# Patient Record
Sex: FEMALE | Race: WHITE | Employment: UNEMPLOYED | URBAN - METROPOLITAN AREA
[De-identification: names, ages, dates, MRNs, and addresses within clinical notes are randomized per-mention and may not be internally consistent; named-entity substitution may affect disease eponyms.]

---

## 2019-04-07 ENCOUNTER — APPOINTMENT (OUTPATIENT)
Dept: GENERAL RADIOLOGY | Age: 29
End: 2019-04-07
Attending: EMERGENCY MEDICINE
Payer: MEDICAID

## 2019-04-07 ENCOUNTER — APPOINTMENT (OUTPATIENT)
Dept: CT IMAGING | Age: 29
End: 2019-04-07
Attending: EMERGENCY MEDICINE
Payer: MEDICAID

## 2019-04-07 ENCOUNTER — HOSPITAL ENCOUNTER (EMERGENCY)
Age: 29
Discharge: HOME | End: 2019-04-08
Attending: EMERGENCY MEDICINE
Payer: MEDICAID

## 2019-04-07 DIAGNOSIS — F19.10 POLYSUBSTANCE ABUSE (HCC): ICD-10-CM

## 2019-04-07 DIAGNOSIS — F18.10: Primary | ICD-10-CM

## 2019-04-07 DIAGNOSIS — G62.0 POLYNEUROPATHY DUE TO DRUGS (HCC): ICD-10-CM

## 2019-04-07 LAB
ALBUMIN SERPL-MCNC: 3.3 G/DL (ref 3.5–5)
ALBUMIN/GLOB SERPL: 0.7 {RATIO} (ref 1.1–2.2)
ALP SERPL-CCNC: 55 U/L (ref 45–117)
ALT SERPL-CCNC: 19 U/L (ref 12–78)
AMMONIA PLAS-SCNC: 22 UMOL/L
AMPHET UR QL SCN: NEGATIVE
ANION GAP SERPL CALC-SCNC: 7 MMOL/L (ref 5–15)
APAP SERPL-MCNC: <2 UG/ML (ref 10–30)
APPEARANCE UR: CLEAR
AST SERPL-CCNC: 34 U/L (ref 15–37)
BACTERIA URNS QL MICRO: ABNORMAL /HPF
BARBITURATES UR QL SCN: NEGATIVE
BASOPHILS # BLD: 0 K/UL (ref 0–0.1)
BASOPHILS NFR BLD: 0 % (ref 0–1)
BENZODIAZ UR QL: NEGATIVE
BILIRUB SERPL-MCNC: 0.4 MG/DL (ref 0.2–1)
BILIRUB UR QL: NEGATIVE
BUN SERPL-MCNC: 12 MG/DL (ref 6–20)
BUN/CREAT SERPL: 21 (ref 12–20)
CALCIUM SERPL-MCNC: 9 MG/DL (ref 8.5–10.1)
CANNABINOIDS UR QL SCN: POSITIVE
CHLORIDE SERPL-SCNC: 104 MMOL/L (ref 97–108)
CO2 SERPL-SCNC: 28 MMOL/L (ref 21–32)
COCAINE UR QL SCN: NEGATIVE
COLOR UR: ABNORMAL
CREAT SERPL-MCNC: 0.57 MG/DL (ref 0.55–1.02)
DIFFERENTIAL METHOD BLD: ABNORMAL
DRUG SCRN COMMENT,DRGCM: ABNORMAL
EOSINOPHIL # BLD: 0 K/UL (ref 0–0.4)
EOSINOPHIL NFR BLD: 0 % (ref 0–7)
EPITH CASTS URNS QL MICRO: ABNORMAL /LPF
ERYTHROCYTE [DISTWIDTH] IN BLOOD BY AUTOMATED COUNT: 13.6 % (ref 11.5–14.5)
ETHANOL SERPL-MCNC: 12 MG/DL
GLOBULIN SER CALC-MCNC: 4.6 G/DL (ref 2–4)
GLUCOSE SERPL-MCNC: 88 MG/DL (ref 65–100)
GLUCOSE UR STRIP.AUTO-MCNC: NEGATIVE MG/DL
HCT VFR BLD AUTO: 30.4 % (ref 35–47)
HGB BLD-MCNC: 10.9 G/DL (ref 11.5–16)
HGB UR QL STRIP: NEGATIVE
IMM GRANULOCYTES # BLD AUTO: 0 K/UL (ref 0–0.04)
IMM GRANULOCYTES NFR BLD AUTO: 0 % (ref 0–0.5)
KETONES UR QL STRIP.AUTO: NEGATIVE MG/DL
LEUKOCYTE ESTERASE UR QL STRIP.AUTO: NEGATIVE
LYMPHOCYTES # BLD: 2.7 K/UL (ref 0.8–3.5)
LYMPHOCYTES NFR BLD: 45 % (ref 12–49)
MCH RBC QN AUTO: 35.6 PG (ref 26–34)
MCHC RBC AUTO-ENTMCNC: 35.9 G/DL (ref 30–36.5)
MCV RBC AUTO: 99.3 FL (ref 80–99)
METHADONE UR QL: NEGATIVE
MONOCYTES # BLD: 0.6 K/UL (ref 0–1)
MONOCYTES NFR BLD: 11 % (ref 5–13)
MYELOCYTES NFR BLD MANUAL: 1 %
NEUTS SEG # BLD: 2.5 K/UL (ref 1.8–8)
NEUTS SEG NFR BLD: 43 % (ref 32–75)
NITRITE UR QL STRIP.AUTO: NEGATIVE
NRBC # BLD: 0 K/UL (ref 0–0.01)
NRBC BLD-RTO: 0 PER 100 WBC
OPIATES UR QL: NEGATIVE
PCP UR QL: NEGATIVE
PH UR STRIP: 6 [PH] (ref 5–8)
PLATELET # BLD AUTO: 180 K/UL (ref 150–400)
PMV BLD AUTO: 10.5 FL (ref 8.9–12.9)
POTASSIUM SERPL-SCNC: 3.3 MMOL/L (ref 3.5–5.1)
PROT SERPL-MCNC: 7.9 G/DL (ref 6.4–8.2)
PROT UR STRIP-MCNC: NEGATIVE MG/DL
RBC # BLD AUTO: 3.06 M/UL (ref 3.8–5.2)
RBC #/AREA URNS HPF: ABNORMAL /HPF (ref 0–5)
RBC MORPH BLD: ABNORMAL
SODIUM SERPL-SCNC: 139 MMOL/L (ref 136–145)
SP GR UR REFRACTOMETRY: 1.01 (ref 1–1.03)
UA: UC IF INDICATED,UAUC: ABNORMAL
UROBILINOGEN UR QL STRIP.AUTO: 1 EU/DL (ref 0.2–1)
WBC # BLD AUTO: 5.9 K/UL (ref 3.6–11)
WBC MORPH BLD: ABNORMAL
WBC URNS QL MICRO: ABNORMAL /HPF (ref 0–4)

## 2019-04-07 PROCEDURE — 70450 CT HEAD/BRAIN W/O DYE: CPT

## 2019-04-07 PROCEDURE — 73630 X-RAY EXAM OF FOOT: CPT

## 2019-04-07 PROCEDURE — 96360 HYDRATION IV INFUSION INIT: CPT

## 2019-04-07 PROCEDURE — 80053 COMPREHEN METABOLIC PANEL: CPT

## 2019-04-07 PROCEDURE — 87086 URINE CULTURE/COLONY COUNT: CPT

## 2019-04-07 PROCEDURE — 81001 URINALYSIS AUTO W/SCOPE: CPT

## 2019-04-07 PROCEDURE — 82140 ASSAY OF AMMONIA: CPT

## 2019-04-07 PROCEDURE — 81025 URINE PREGNANCY TEST: CPT

## 2019-04-07 PROCEDURE — 74011250636 HC RX REV CODE- 250/636: Performed by: EMERGENCY MEDICINE

## 2019-04-07 PROCEDURE — 99285 EMERGENCY DEPT VISIT HI MDM: CPT

## 2019-04-07 PROCEDURE — 36415 COLL VENOUS BLD VENIPUNCTURE: CPT

## 2019-04-07 PROCEDURE — 80307 DRUG TEST PRSMV CHEM ANLYZR: CPT

## 2019-04-07 PROCEDURE — 85025 COMPLETE CBC W/AUTO DIFF WBC: CPT

## 2019-04-07 PROCEDURE — 93005 ELECTROCARDIOGRAM TRACING: CPT

## 2019-04-07 RX ADMIN — SODIUM CHLORIDE 1000 ML: 900 INJECTION, SOLUTION INTRAVENOUS at 22:18

## 2019-04-08 ENCOUNTER — APPOINTMENT (OUTPATIENT)
Dept: GENERAL RADIOLOGY | Age: 29
End: 2019-04-08
Attending: EMERGENCY MEDICINE
Payer: MEDICAID

## 2019-04-08 VITALS
TEMPERATURE: 98.3 F | DIASTOLIC BLOOD PRESSURE: 69 MMHG | RESPIRATION RATE: 14 BRPM | SYSTOLIC BLOOD PRESSURE: 96 MMHG | OXYGEN SATURATION: 97 % | HEART RATE: 86 BPM

## 2019-04-08 LAB
ATRIAL RATE: 93 BPM
CALCULATED P AXIS, ECG09: 40 DEGREES
CALCULATED R AXIS, ECG10: 39 DEGREES
CALCULATED T AXIS, ECG11: 32 DEGREES
DIAGNOSIS, 93000: NORMAL
HCG UR QL: NEGATIVE
P-R INTERVAL, ECG05: 162 MS
Q-T INTERVAL, ECG07: 362 MS
QRS DURATION, ECG06: 82 MS
QTC CALCULATION (BEZET), ECG08: 450 MS
VENTRICULAR RATE, ECG03: 93 BPM

## 2019-04-08 PROCEDURE — 71046 X-RAY EXAM CHEST 2 VIEWS: CPT

## 2019-04-08 PROCEDURE — 74011250637 HC RX REV CODE- 250/637: Performed by: EMERGENCY MEDICINE

## 2019-04-08 RX ORDER — ACETAMINOPHEN 325 MG/1
650 TABLET ORAL
Status: COMPLETED | OUTPATIENT
Start: 2019-04-08 | End: 2019-04-08

## 2019-04-08 RX ORDER — ASPIRIN 325 MG/1
100 TABLET, FILM COATED ORAL DAILY
Status: DISCONTINUED | OUTPATIENT
Start: 2019-04-08 | End: 2019-04-08

## 2019-04-08 RX ORDER — UREA 10 %
50 LOTION (ML) TOPICAL DAILY
Qty: 30 TAB | Refills: 0 | Status: SHIPPED | OUTPATIENT
Start: 2019-04-08 | End: 2019-05-08

## 2019-04-08 RX ORDER — ACETAMINOPHEN 325 MG/1
325 TABLET ORAL
Status: DISCONTINUED | OUTPATIENT
Start: 2019-04-08 | End: 2019-04-08

## 2019-04-08 RX ORDER — FOLIC ACID 1 MG/1
1 TABLET ORAL DAILY
Qty: 30 TAB | Refills: 0 | Status: SHIPPED | OUTPATIENT
Start: 2019-04-08 | End: 2019-05-08

## 2019-04-08 RX ORDER — ASPIRIN 325 MG/1
100 TABLET, FILM COATED ORAL
Status: COMPLETED | OUTPATIENT
Start: 2019-04-08 | End: 2019-04-08

## 2019-04-08 RX ADMIN — ACETAMINOPHEN 650 MG: 325 TABLET ORAL at 02:06

## 2019-04-08 RX ADMIN — MULTIPLE VITAMINS W/ MINERALS TAB 1 TABLET: TAB at 00:30

## 2019-04-08 RX ADMIN — Medication 100 MG: at 00:30

## 2019-04-08 NOTE — ED NOTES
Pt resting on stretcher in a position of comfort, conversing with visitor at bedside. No apparent distress at this time.

## 2019-04-08 NOTE — ED NOTES
Bedside and Verbal shift change report given to [address] [facility] [address]  (oncoming nurse) by Sue Elizondo (offgoing nurse). Report included the following information SBAR, ED Summary, MAR and Recent Results.

## 2019-04-08 NOTE — ED NOTES
Poison control reports extremity and numbness and legs can be caused by whippits from chronic use. Acute effects are expected to have worn off by now per her estimated last usage of 8am. Suggest watching patient until patient is back to baseline

## 2019-04-08 NOTE — ED PROVIDER NOTES
EMERGENCY DEPARTMENT HISTORY AND PHYSICAL EXAM  
      
 
Date: 4/7/2019 Patient Name: Dayana Romero History of Presenting Illness Chief Complaint Patient presents with  Drug Overdose Used \"whippits\" which are apparently co2 cartridges found by grandma not responsive History Provided By:  Patient, EMS and Family HPI: Dayana Romero is a 34 y.o. female, pmhx alcoholism, polysubstance abuse, who presents via EMS to the ED with report of tingling and numbness to bilateral hands and feet. She reports that her feet \"numbness\" began 3 days ago but noted to have tingling to her hands today as well. Patient reports that she cannot move her hands while demonstrating opening and closing of her hand and flexing her wrist.  Patient also notes that she has \"numbness\" to her feet but then complains of pain. Patient with extensive history of huffing. Notes to do 150 canisters for whippets on a daily basis. Patient also notes she drinks 750 mL of vodka which she drinks daily. Patient also reports to marijuana use. Per EMS patient was confused mental status on their arrival.  EMS and police noted to be involved prior to patient's transfer to the emergency department due to her significant other also being found at the scene unresponsive. He was transported [facility] and reportedly has a large stroke. Patient's family member who presents at the end of interview notes that patient was with her children earlier today. Patient specifically denies any associated fevers, chills, nausea, vomiting, diarrhea, abd pain, CP, SOB, urinary sxs, changes in BM, or headache. PCP: Thaddeus Keane MD 
 
Social Hx: + pk/d d tobacco  + daily 750ml vodka/day EtOH , thc and whipits Illicit Drugs There are no other complaints, changes, or physical findings at this time. No Known Allergies Current Facility-Administered Medications Medication Dose Route Frequency Provider Last Rate Last Dose  acetaminophen (TYLENOL) tablet 650 mg  650 mg Oral NOW Gregorio Hernández MD      
 
Current Outpatient Medications Medication Sig Dispense Refill  thiamine (B-1) 50 mg tablet Take 1 Tab by mouth daily for 30 days. 30 Tab 0  
 folic acid (FOLVITE) 1 mg tablet Take 1 Tab by mouth daily for 30 days. 30 Tab 0  ibuprofen (MOTRIN) 800 mg tablet Take 1 Tab by mouth every eight (8) hours. 90 Tab 4  
 acetaminophen (TYLENOL) 325 mg tablet Take  by mouth every four (4) hours as needed. Past History Past Medical History: 
Past Medical History:  
Diagnosis Date  ADHD 10 Dr. Finesse Graham  ADHD (attention deficit hyperactivity disorder) 9/10/2009  Advanced care planning/counseling discussion 5/3/16  Anemia Before Preg. Not taking [facility] Gave birth to child recently [id number] Baby Boy weighed 4 lb 15 oz  HX OTHER MEDICAL   
 history of viral meningitis 1990  
 HX OTHER MEDICAL   
 stillbirth 45 wks 12/2010 [address] 2008? Achilles tendonitis  Opiate dependence ([address] started after ankle injury Past Surgical History: 
Past Surgical History:  
Procedure Laterality Date  HX GYN    
 3 vaginal births Family History: 
Family History Problem Relation Age of Onset  Hypertension Father  Hypertension Mother  Cancer Maternal Grandfather  Heart Disease Paternal Grandmother  Bipolar Disorder Mother  Bipolar Disorder Maternal Aunt Social History: 
Social History Tobacco Use  Smoking status: Current Every Day Smoker Packs/day: 1.00 Years: 5.00 Pack years: 5.00 Types: Cigarettes  Smokeless tobacco: Never Used  Tobacco comment: will quit with  Substance Use Topics  Alcohol use: No  
  Alcohol/week: 0.0 oz  
  Comment: prior drinking to intoxication.  Drug use: No  
  Types: Opiates, Other, Marijuana, Prescription Comment: [facility] clinic daily-Last day 10/13/15. [address] Allergies: 
No Known Allergies Review of Systems Review of Systems Constitutional: Negative. Negative for fever. Eyes: Negative. Respiratory: Negative. Negative for shortness of breath. Cardiovascular: Negative for chest pain. Gastrointestinal: Negative for abdominal pain, nausea and vomiting. Endocrine: Negative. Genitourinary: Negative. Negative for difficulty urinating, dysuria and hematuria. Musculoskeletal: Negative. Skin: Negative. Neurological: Positive for light-headedness and numbness. Psychiatric/Behavioral: Negative for suicidal ideas. All other systems reviewed and are negative. Physical Exam  
Physical Exam  
Constitutional: She is oriented to person, place, and time. She appears well-developed and well-nourished. No distress. HENT:  
Head: Normocephalic and atraumatic. Nose: Nose normal.  
Eyes: Conjunctivae and EOM are normal. No scleral icterus. Neck: Normal range of motion. No tracheal deviation present. Cardiovascular: Normal rate, regular rhythm, normal heart sounds and intact distal pulses. Exam reveals no friction rub. No murmur heard. Pulses: 
     Dorsalis pedis pulses are 2+ on the right side, and 2+ on the left side. Pulmonary/Chest: Effort normal and breath sounds normal. No stridor. No respiratory distress. She has no wheezes. She has no rales. Abdominal: Soft. Bowel sounds are normal. She exhibits no distension. There is no tenderness. There is no rebound. Musculoskeletal: Normal range of motion. She exhibits no tenderness. Full range of motion of all extremities. Able to open and close fingers on both hands and flex/extend the wrists. Neurological: She is alert and oriented to person, place, and time. She displays tremor. She displays no atrophy. A sensory deficit (hyperesthesia to bilateral feet) is present. No cranial nerve deficit.  She exhibits normal muscle tone. She displays no seizure activity. Coordination and gait ( pt ambulated with staff providing no physical support) normal. GCS eye subscore is 4. GCS verbal subscore is 5. GCS motor subscore is 6. Skin: Skin is warm and dry. Ecchymosis (pt with discoloration to R foot. please see associated pictures) and rash noted. Rash is macular (to R foot). She is not diaphoretic. Psychiatric: She has a normal mood and affect. Her speech is normal and behavior is normal. Judgment and thought content normal. Cognition and memory are normal.  
Nursing note and vitals reviewed. Diagnostic Study Results Labs - Recent Results (from the past 12 hour(s)) EKG, 12 LEAD, INITIAL Collection Time: 04/07/19  9:16 PM  
Result Value Ref Range Ventricular Rate 93 BPM  
 Atrial Rate 93 BPM  
 P-R Interval 162 ms QRS Duration 82 ms Q-T Interval 362 ms QTC Calculation (Bezet) 450 ms Calculated P Axis 40 degrees Calculated R Axis 39 degrees Calculated T Axis 32 degrees Diagnosis Normal sinus rhythm Normal ECG No previous ECGs available URINALYSIS W/ REFLEX CULTURE Collection Time: 04/07/19  9:29 PM  
Result Value Ref Range Color YELLOW/STRAW Appearance CLEAR CLEAR Specific gravity 1.010 1.003 - 1.030    
 pH (UA) 6.0 5.0 - 8.0 Protein NEGATIVE  NEG mg/dL Glucose NEGATIVE  NEG mg/dL Ketone NEGATIVE  NEG mg/dL Bilirubin NEGATIVE  NEG Blood NEGATIVE  NEG Urobilinogen 1.0 0.2 - 1.0 EU/dL Nitrites NEGATIVE  NEG Leukocyte Esterase NEGATIVE  NEG    
 WBC 0-4 0 - 4 /hpf  
 RBC 0-5 0 - 5 /hpf Epithelial cells FEW FEW /lpf Bacteria 1+ (A) NEG /hpf  
 UA:UC IF INDICATED URINE CULTURE ORDERED (A) CNI    
DRUG SCREEN, URINE Collection Time: 04/07/19  9:29 PM  
Result Value Ref Range AMPHETAMINES NEGATIVE  NEG    
 BARBITURATES NEGATIVE  NEG  BENZODIAZEPINES NEGATIVE  NEG    
 COCAINE NEGATIVE  NEG    
 METHADONE NEGATIVE  NEG    
 OPIATES NEGATIVE  NEG    
 PCP(PHENCYCLIDINE) NEGATIVE  NEG    
 THC (TH-CANNABINOL) POSITIVE (A) NEG Drug screen comment (NOTE) ACETAMINOPHEN Collection Time: 04/07/19  9:33 PM  
Result Value Ref Range Acetaminophen level <2 (L) 10 - 30 ug/mL AMMONIA Collection Time: 04/07/19  9:33 PM  
Result Value Ref Range Ammonia 22 <32 UMOL/L  
ETHYL ALCOHOL Collection Time: 04/07/19  9:33 PM  
Result Value Ref Range ALCOHOL(ETHYL),SERUM 12 (H) <10 MG/DL  
CBC WITH AUTOMATED DIFF Collection Time: 04/07/19  9:33 PM  
Result Value Ref Range WBC 5.9 3.6 - 11.0 K/uL  
 RBC 3.06 (L) 3.80 - 5.20 M/uL  
 HGB 10.9 (L) 11.5 - 16.0 g/dL HCT 30.4 (L) 35.0 - 47.0 % MCV 99.3 (H) 80.0 - 99.0 FL  
 MCH 35.6 (H) 26.0 - 34.0 PG  
 MCHC 35.9 30.0 - 36.5 g/dL  
 RDW 13.6 11.5 - 14.5 % PLATELET 543 705 - 494 K/uL MPV 10.5 8.9 - 12.9 FL  
 NRBC 0.0 0  WBC ABSOLUTE NRBC 0.00 0.00 - 0.01 K/uL NEUTROPHILS 43 32 - 75 % LYMPHOCYTES 45 12 - 49 % MONOCYTES 11 5 - 13 % EOSINOPHILS 0 0 - 7 % BASOPHILS 0 0 - 1 % MYELOCYTES 1 % IMMATURE GRANULOCYTES 0 0.0 - 0.5 % ABS. NEUTROPHILS 2.5 1.8 - 8.0 K/UL  
 ABS. LYMPHOCYTES 2.7 0.8 - 3.5 K/UL  
 ABS. MONOCYTES 0.6 0.0 - 1.0 K/UL  
 ABS. EOSINOPHILS 0.0 0.0 - 0.4 K/UL  
 ABS. BASOPHILS 0.0 0.0 - 0.1 K/UL  
 ABS. IMM. GRANS. 0.0 0.00 - 0.04 K/UL  
 DF MANUAL    
 RBC COMMENTS NORMOCYTIC, NORMOCHROMIC    
 WBC COMMENTS REACTIVE LYMPHS    
METABOLIC PANEL, COMPREHENSIVE Collection Time: 04/07/19  9:33 PM  
Result Value Ref Range Sodium 139 136 - 145 mmol/L Potassium 3.3 (L) 3.5 - 5.1 mmol/L Chloride 104 97 - 108 mmol/L  
 CO2 28 21 - 32 mmol/L Anion gap 7 5 - 15 mmol/L Glucose 88 65 - 100 mg/dL BUN 12 6 - 20 MG/DL Creatinine 0.57 0.55 - 1.02 MG/DL  
 BUN/Creatinine ratio 21 (H) 12 - 20 GFR est AA >60 >60 ml/min/1.73m2 GFR est non-AA >60 >60 ml/min/1.73m2 Calcium 9.0 8.5 - 10.1 MG/DL Bilirubin, total 0.4 0.2 - 1.0 MG/DL  
 ALT (SGPT) 19 12 - 78 U/L  
 AST (SGOT) 34 15 - 37 U/L Alk. phosphatase 55 45 - 117 U/L Protein, total 7.9 6.4 - 8.2 g/dL Albumin 3.3 (L) 3.5 - 5.0 g/dL Globulin 4.6 (H) 2.0 - 4.0 g/dL A-G Ratio 0.7 (L) 1.1 - 2.2 Radiologic Studies -  
XR CHEST PA LAT Final Result IMPRESSION: No acute cardiopulmonary process seen XR FOOT RT MIN 3 V Final Result IMPRESSION: No acute abnormality. CT HEAD WO CONT Final Result IMPRESSION: Normal study CT Results  (Last 48 hours) 04/07/19 2208  CT HEAD WO CONT Final result Impression:  IMPRESSION: Normal study Narrative:  EXAM: CT HEAD WO CONT INDICATION: Confusion/delirium, altered LOC, unexplained; ams COMPARISON: None. CONTRAST: None. TECHNIQUE: Unenhanced CT of the head was performed using 5 mm images. Brain and  
bone windows were generated. CT dose reduction was achieved through use of a  
standardized protocol tailored for this examination and automatic exposure  
control for dose modulation. FINDINGS:  
The ventricles and sulci are normal in size, shape and configuration and  
midline. There is no significant white matter disease. There is no intracranial  
hemorrhage, extra-axial collection, mass, mass effect or midline shift. The  
basilar cisterns are open. No acute infarct is identified. The bone windows  
demonstrate no abnormalities. The visualized portions of the paranasal sinuses  
and mastoid air cells are clear. CXR Results  (Last 48 hours) 04/08/19 0114  XR CHEST PA LAT Final result Impression:  IMPRESSION: No acute cardiopulmonary process seen Narrative:  EXAM: XR CHEST PA LAT INDICATION: Acute mental status change COMPARISON: None. FINDINGS: PA and lateral radiographs of the chest demonstrate clear lungs.  The  
 cardiac and mediastinal contours and pulmonary vascularity are normal. The bones  
and soft tissues are within normal limits. Medical Decision Making I am the first provider for this patient. I reviewed the vital signs, available nursing notes, past medical history, past surgical history, family history and social history. Vital Signs-Reviewed the patient's vital signs. Patient Vitals for the past 12 hrs: 
 Temp Pulse Resp BP SpO2  
04/08/19 0115  86 16 92/56 99 % 04/08/19 0028  94 16 98/65 98 % 04/07/19 2145    103/66 100 % 04/07/19 2130    104/70 (!) 84 % 04/07/19 2115    95/58 97 % 04/07/19 2100     98 % 04/07/19 2100    (!) 87/61 96 % 04/07/19 2048 98.3 °F (36.8 °C) (!) 103 14 99/70 96 % Pulse Oximetry Analysis - 96% on RA Cardiac Monitor:  
Rate: 103 bpm 
Rhythm: Rhys Jackson Records Reviewed: Nursing Notes, Old Medical Records, Previous electrocardiograms, Ambulance Run Sheet, Previous Radiology Studies and Previous Laboratory Studies Provider Notes (Medical Decision Making): DDX: 
Alcoholic polyneuropathy, cardiac sensitization, intracranial hemorrhage, CVA,  sequela from chronic huffing, polysubstance abuse, electrolyte abnormality Plan: 
EKG, head CT, labs, poison center consult Impression: 
Polysubstance abuse, alcoholism, huffing, polyneuropathy ED Course:  
Initial assessment performed. The patients presenting problems have been discussed, and they are in agreement with the care plan formulated and outlined with them. I have encouraged them to ask questions as they arise throughout their visit. I reviewed our electronic medical record system for any past medical records that were available that may contribute to the patients current condition, the nursing notes and and vital signs from today's visit Nursing notes will be reviewed as they become available in realtime while the pt has been in the ED.  
Santino Sin MD 
 TOBACCO COUNSELING: 
During evaluation pt reported that they are a current tobacco user. I have spent 3 minutes discussing the medical risks of prolonged smoking habits and advised the patient of the benefits of the cessation of smoking, providing specific suggestions on how to quit. Pt has been counseled and encouraged to quit as soon as possible in order to decrease further risks to their health. Pt has conveyed their understanding of the risks involved should they continue to use tobacco products. Brigid Hayes MD 
 
EKG interpretation 2116: NSR, nl Axis, rate 93; , QRS 82, QTc 450; no acute ischemia; Brigid Hayes MD 
 
I personally reviewed pt's imaging. Official read by radiology noted above. Brigid Hayes MD 
 
  
 
PROGRESS NOTE: 
2:05 AM 
Pt notes concern over her ability to ambulate, ambulation trial passed with staff supervision. Brigid Hayes MD 
 
 
2:05 AM 
Progress note: 
Pt noted to be ready for discharge. Discussed lab and imaging findings with pt and/or family, specifically noting negative ct and normal electorlytes. Pt will follow up with  Mental health/substance abuse counselors as instructed. All questions have been answered, pt voiced understanding and agreement with plan. If narcotics were prescribed, pt's  record was reviewed and pt was advised not to drive or operate heavy machinery. If abx were prescribed, pt advised that diarrhea and rash are possible side effects of the medications. Specific return precautions provided in addition to instructions for pt to return to the ED immediately should sx worsen at any time. Brigid Hayes MD 
 
 
Critical Care Time:  
 
none Diagnosis Clinical Impression:  
1. Inhalant abuse, daily use ([address] 2. Alcoholism /alcohol abuse ([address] 3. 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 Polysubstance abuse ([facility] [address] PLAN: 
1. Current Discharge Medication List  
  
START taking these medications Details  
thiamine (B-1) 50 mg tablet Take 1 Tab by mouth daily for 30 days. Qty: 30 Tab, Refills: 0  
  
folic acid (FOLVITE) 1 mg tablet Take 1 Tab by mouth daily for 30 days. Qty: 30 Tab, Refills: 0  
  
  
 
2. Follow-up Information Follow up With Specialties Details Why Contact Info Edgar Starr MD Family Practice Schedule an appointment as soon as possible for a visit in 2 days  [address] [place] [address] 
[phone] Return to ED if worse Disposition: 
2:06 AM 
The patient's results have been reviewed with family and/or caregiver. They verbally convey their understanding and agreement of the patient's signs, symptoms, diagnosis, treatment and prognosis and additionally agree to follow up as recommended in the discharge instructions or to return to the Emergency Room should the patient's condition change prior to their follow-up appointment. The family and/or caregiver verbally agrees with the care-plan and all of their questions have been answered. The discharge instructions have also been provided to the them with educational information regarding the patient's diagnosis as well a list of reasons why the patient would want to return to the ER prior to their follow-up appointment should their condition change. Nyla Marroquin MD 
 
 
 
 
Please note that this dictation was completed with [employer], the computer voice recognition software. Quite often unanticipated grammatical, syntax, homophones, and other interpretive errors are inadvertently transcribed by the computer software. Please disregard these errors. Please excuse any errors that have escaped final proofreading This note will not be viewable in [address]

## 2019-04-08 NOTE — ED NOTES
Pt ambulatory in hallway, non slip footwear provided prior to ambulation. Saline lock removed. Pt discharged ambulatory, accompanied by male . No acute distress at time of departure.

## 2019-04-08 NOTE — DISCHARGE INSTRUCTIONS
Patient Education        Alcohol and Drug Problems: Care Instructions  Your Care Instructions    You can improve your life and health by stopping your use of alcohol or drugs. Ending dependency on alcohol or drugs may help you feel and sleep better. You may get along better with your family, friends, and coworkers. There are medicines and programs that can help. Follow-up care is a key part of your treatment and safety. Be sure to make and go to all appointments, and call your doctor if you are having problems. It's also a good idea to know your test results and keep a list of the medicines you take. How can you care for yourself at home? · If you have been given medicine to help keep you sober or reduce your cravings, be sure to take it as prescribed. · Talk to your doctor about programs that can help you stop using drugs or drinking alcohol. · If your doctor prescribes disulfiram (Antabuse), do not drink any alcohol while you are taking this medicine. You may have severe, even life-threatening, side effects from even small amounts of alcohol. · Do not tempt yourself by keeping alcohol or drugs in your home. · Learn how to say no when other people drink or use drugs. Or don't spend time with people who drink or use drugs. · Use the time and money spent on drinking or drugs to do something fun with your family or friends. Preventing a relapse  · Do not drink alcohol or use drugs at all. Using any amount of alcohol or drugs greatly increases your risk for relapse. · Seek help from organizations such as Alcoholics Anonymous, Narcotics Anonymous, or treatment facilities if you feel the need to drink alcohol or use drugs again. · Remember that recovery is a lifelong process. · Stay away from situations, friends, or places that may lead you to drink or use drugs. · Have a plan to spot and deal with relapse. Learn to recognize changes in your thinking that lead you to drink or use drugs.  These are warning signs. Get help before you start to drink or use drugs again. · Get help as soon as you can if you relapse. Some people make a plan with another person that outlines what they want that person to do for them if they relapse. The plan usually includes how to handle the relapse and who to notify in case of relapse. · Don't give up. Remember that a relapse does not mean that you have failed. Use the experience to learn the triggers that lead you to drink or use drugs. Then quit again. Many people have several relapses before they are able to quit for good. When should you call for help? Call 911 anytime you think you may need emergency care. For example, call if:    · You feel you cannot stop from hurting yourself or someone else.   [facility] your doctor now or seek immediate medical care if:    · You have serious withdrawal symptoms such as confusion, hallucinations, or severe trembling.    Watch closely for changes in your health, and be sure to contact your doctor if:    · You have a relapse.     · You need more help or support to stop. Where can you learn more? Go to http://nav-torsten.info/. Enter [id number] in the search box to learn more about \"Alcohol and Drug Problems: Care Instructions. \"  Current as of: May 7, 2018  Content Version: 11.9  © 0454-0097 [employer]. Care instructions adapted under license by [employer] (which disclaims liability or warranty for this information). If you have questions about a medical condition or this instruction, always ask your healthcare professional. [place] [address] any warranty or liability for your use of this information.          Patient Education        Misuse of Multiple Drugs: Care Instructions  Your Care Instructions    You have had treatment to help your body get rid of a combination of any of these drugs:  · Prescription medicines  · Over-the-counter medicines  · Alcohol  · Illegal drugs  Taking some drugs together may cause a bad reaction. They can have unexpected or stronger effects on your body and mind. For example, benzodiazepines (such as alprazolam and lorazepam) and alcohol both depress the nervous system. Taken together, each one is stronger than when it is taken by itself. You are getting better, but it takes time for the drugs to leave your body. It may take up to 2 weeks for your mind to clear and your mood to improve. Depending on the drugs you took, the doctor might have:  · Watched your symptoms or done tests to find out what drugs were in your body. · Treated you to control your breathing, blood pressure, and heart rate. · Tried to remove the drugs from your body by pumping your stomach or giving you a substance by mouth that absorbs chemicals. · Given you a substance that neutralizes chemicals (antidote). · Given you oxygen to help you breathe. · Given you fluids. The doctor also watched you carefully to make sure you were recovering safely. Follow-up care is a key part of your treatment and safety. Be sure to make and go to all appointments, and call your doctor if you are having problems. It's also a good idea to know your test results and keep a list of the medicines you take. How can you care for yourself at home? · When you take substances like alcohol and some drugs regularly, your body gets used to them. This is called dependency. If you are dependent on them, you may have withdrawal symptoms when you stop taking them. These symptoms may include trembling, feeling restless, and sweating. To help get past these:  ? Get plenty of rest.  ? Drink lots of fluids. ? Stay active. ? Eat a healthy diet. · If you had a tube in your throat to help you breathe, you may have a sore throat or hoarseness that can last a few days. Drinking fluids may help soothe your throat. Get help to stop using drugs. Talk to your doctor about drug and alcohol treatment programs.   When should you call for help? Call 911 anytime you think you may need emergency care. For example, call if:    · You feel you cannot stop from hurting yourself or someone else.   [facility] your doctor now or seek immediate medical care if:    · You have new or worse symptoms of withdrawal, such as trembling, feeling restless, and sweating, that you can't manage at home.    Watch closely for changes in your health, and be sure to contact your doctor if:    · You do not get better as expected.     · You need help finding the right place to get help with drug or alcohol problems. Where can you learn more? Go to http://nav-torsten.info/. Enter B109 in the search box to learn more about \"Misuse of Multiple Drugs: Care Instructions. \"  Current as of: May 7, 2018  Content Version: 11.9  © 9503-9863 [employer]. Care instructions adapted under license by [employer] (which disclaims liability or warranty for this information). If you have questions about a medical condition or this instruction, always ask your healthcare professional. [place] [address] any warranty or liability for your use of this information. Patient Education        Learning About Drug Misuse  What is drug misuse? Drug misuse means using drugs in a way that harms you or causes you to harm others. Your doctor may call it substance use disorder or drug abuse. It's possible to misuse almost any type of drug, including illegal drugs, prescription drugs, and over-the-counter drugs. An overdose happens when a person takes more than the normal or recommended amount of a drug. An overdose can result in harmful symptoms or death. Could you have a problem? If there's a chance you may be misusing drugs, it's important to find out. So ask yourself a few questions. · Do you spend a lot of time thinking about your medicine or other drug--getting it and using it?  Has that taken the place of other things you used to enjoy?  · Have you had problems with your family or friends, or at work, because of your use? · Do you use drugs even when you've told yourself you won't? Do you think you might have a problem? If you do, then you've just taken an important first step. Many people have overcome this problem. And most of them started by reaching out to others, like caring friends or family, their doctor, or a support group. How is drug misuse treated? If you think you may have a problem with drugs, talk to your doctor. You and your doctor can decide whether you have a problem and what type of treatment might help you. You may need to stay in a hospital at first, so that you can be treated for withdrawal symptoms. One of the goals of treatment is helping you get used to life without the drug. Counseling can help you prepare for people or situations that might tempt you to start using again. You can practice these skills through one-on-one counseling, family therapy, or group therapy. Therapy may be part of inpatient treatment, where you stay in a treatment center. Or it may be part of outpatient treatment, where you can fit your therapy around your job or other responsibilities. Another goal of treatment is getting ongoing support for your drug-free life. Many people find support by going to meetings like Narcotics Anonymous or SMART Recovery. This type of support can help you feel less alone and more motivated to stay drug-free. You might talk to your doctor or do an online search for local treatment programs. Or you might tell a friend or loved one that you need help. Follow-up care is a key part of your treatment and safety. Be sure to make and go to all appointments, and call your doctor if you are having problems. It's also a good idea to know your test results and keep a list of the medicines you take. Where can you learn more? Go to http://nav-torsten.info/.   Enter [phone] in the search box to learn more about \"Learning About Drug Misuse. \"  Current as of: May 7, 2018  Content Version: 11.9  © 1517-0752 [employer]. Care instructions adapted under license by [employer] (which disclaims liability or warranty for this information). If you have questions about a medical condition or this instruction, always ask your healthcare professional. [address] any warranty or liability for your use of this information. Numbness and Tingling: Care Instructions  Your Care Instructions    Many things can cause numbness or tingling. Swelling may put pressure on a nerve. This could cause you to lose feeling or have a pins-and-needles sensation on part of your body. Nerves may be damaged from trauma, toxins, or diseases, such as diabetes or multiple sclerosis (MS). Sometimes, though, the cause is not clear. If there is no clear reason for your symptoms, and you are not having any other symptoms, your doctor may suggest watching and waiting for a while to see if the numbness or tingling goes away on its own. Your doctor may want you to have blood or nerve tests to find the cause of your symptoms. Follow-up care is a key part of your treatment and safety. Be sure to make and go to all appointments, and call your doctor if you are having problems. It's also a good idea to know your test results and keep a list of the medicines you take. How can you care for yourself at home? · If your doctor prescribes medicine, take it exactly as directed. Call your doctor if you think you are having a problem with your medicine. · If you have any swelling, put ice or a cold pack on the area for 10 to 20 minutes at a time. Put a thin cloth between the ice and your skin. When should you call for help? Call 911 anytime you think you may need emergency care.  For example, call if:    · You have weakness, numbness, or tingling in both legs.     · You lose bowel or bladder control.     · You have symptoms of a stroke. These may include:  ? Sudden numbness, tingling, weakness, or loss of movement in your face, arm, or leg, especially on only one side of your body. ? Sudden vision changes. ? Sudden trouble speaking. ? Sudden confusion or trouble understanding simple statements. ? Sudden problems with walking or balance. ? A sudden, severe headache that is different from past headaches.    Watch closely for changes in your health, and be sure to contact your doctor if you have any problems, or if:    · You do not get better as expected. Where can you learn more? Go to http://nav-torsten.info/. Enter T463 in the search box to learn more about \"Numbness and Tingling: Care Instructions. \"  Current as of: Marleny 3, 2018  Content Version: 11.9  © 1730-1997 [employer]. Care instructions adapted under license by [employer] (which disclaims liability or warranty for this information). If you have questions about a medical condition or this instruction, always ask your healthcare professional. [place] [address] any warranty or liability for your use of this information.             [address]:  [place] [address]  [address] [place] [address]       [phone]      Accepts Insured Patients Only:  [facility]  [address]       [phone]  Near the corner of [facility] [address] [facility] [address] in the near [facility]. Accepts most insurance including Medicaid/Medicare. No psychiatry. On the [facility] bus line. [address] most major insurances. Psychiatry available. Some DBT groups. [address] [place] [address]. Lazaro [phone]   Loli Cade, [facility] ([place])  [address] [facility], [facility] ([place])  Omega  DAMON Dean (Adolescents [place])    [address] ([address], [facility] ([address] [facility])    [address] [facility], [address] [phone]   Mook Moseley [facility] (Trauma)   Monse Castillo ([address])    [facility] [place])    [phone]   Mixture of psychologists and psychiatrists. They do not accept Medicaid or Medicare. [facility]  [address] [place], [place] [address]ve       [phone]   Mixture of clinical social workers and psychologists. [address] [facility]        [phone]  [address] [facility], [address] [facility] Counseling and Treatment  (Clinicians: Chu Rubio LCSW and CUAUHTEMOC Seymour both specialize in Trauma)  [address] , 9 [facility]        [phone]     Wendi Lay 40 [address] [place] [address], [address]         [address], [address] [facility], [address], [facility]        [address] [facility], [address] [facility], [facility]        [address] Counseling  [address]  [place], [address] [facility]        Jose Hewitt 1778 (*Two psychologists practice here)  DOYLE Wright [address] [place], [address]  [place], [address]        [phone]    Sliding Scale/Financial Aid/Differing Payment Options  [place] [id number] [facility]      [phone]   Variety of treatment options, including DBT. [employer]  [address]       [phone]   Provides a variety of group and individual counseling options. Insurance, Medicaid, Medicare and sliding scale    [facility], [address]      [phone]    [address] [facility] Psychological Services and Development ([facility])  [address] Dhruv Arellano, [facility], [address]    [phone]  Training clinic for [employer] in Psychology; [facility] also carry some cases as well. Director: Roselyn Tirado, Ph.D., LCP, NCSP (* She specializes in Anxiety; Child & Adol. Mental health)      Medicaid/Medicare providers in the [facility] OF [address]       [phone]    [facility]         [address]       [phone]    [place]oxie  [address], [address]    [phone] [address], [address]      [phone]    [address] [facility] [address]     [phone]  [place], [address] [place] [address], [address]    [address] [facility], [address] [facility] Counseling Main Office    [phone] [facility] [facility]    Intensive Community Outreach Services (ICOS)  Call ahead for appointment time  [address] [facility]     [phone]    [address]      [facility]    [facility]      [address] [facility] [address], [address], Sof[place] holguin)   [phone]      Services for patients without Medicaid, Medicare or Insurance  [facility] [address]       [phone]   See handout in separate folder    [address] [facility] [address] on-site, psychiatry, AA meetings, counseling and social workers  [place]town: Bharath Lomas 71     [address]:  [facility] 129 [phone]

## 2019-04-08 NOTE — ED TRIAGE NOTES
Patient arrives to the emergency department via EMS with a chief complaint of a drug overdose. Patient was found by fathers dad and was responsive but lethargic and felt like her feet were \"frozen\". Patient admits to using \"whippits\" which are co2 cartridges per patient and says she took about 150 of them. Patient also admits to marijuana usage today. 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 Patient is a&o x4.

## 2019-04-08 NOTE — ED NOTES
On the phone with poison control; reports whippits are nitrous oxide and patient agreed that was what they were

## 2019-04-08 NOTE — ED NOTES
Pt resting quietly on stretcher on right side. Alert and oriented at this time. Notes continued pain in hands and feet. Medicated per orders. Updated on plan of care.

## 2019-04-09 LAB
BACTERIA SPEC CULT: NORMAL
CC UR VC: NORMAL
SERVICE CMNT-IMP: NORMAL

## 2019-05-15 ENCOUNTER — OFFICE VISIT (OUTPATIENT)
Dept: FAMILY MEDICINE CLINIC | Age: 29
End: 2019-05-15

## 2019-05-15 VITALS
OXYGEN SATURATION: 98 % | TEMPERATURE: 97.3 F | BODY MASS INDEX: 24.88 KG/M2 | HEIGHT: 61 IN | WEIGHT: 131.8 LBS | HEART RATE: 91 BPM | RESPIRATION RATE: 18 BRPM | SYSTOLIC BLOOD PRESSURE: 114 MMHG | DIASTOLIC BLOOD PRESSURE: 81 MMHG

## 2019-05-15 DIAGNOSIS — Z72.0 TOBACCO ABUSE: ICD-10-CM

## 2019-05-15 DIAGNOSIS — F90.9 ATTENTION DEFICIT HYPERACTIVITY DISORDER (ADHD), UNSPECIFIED ADHD TYPE: Primary | ICD-10-CM

## 2019-05-15 RX ORDER — LIDOCAINE 50 MG/G
PATCH TOPICAL
Qty: 1 EACH | Refills: 0 | Status: CANCELLED | OUTPATIENT
Start: 2019-05-15

## 2019-05-15 RX ORDER — AMOXICILLIN AND CLAVULANATE POTASSIUM 500; 125 MG/1; MG/1
TABLET, FILM COATED ORAL
Refills: 0 | COMMUNITY
Start: 2019-05-13 | End: 2020-12-18

## 2019-05-15 RX ORDER — GABAPENTIN 100 MG/1
CAPSULE ORAL
Refills: 0 | COMMUNITY
Start: 2019-05-03 | End: 2021-03-18

## 2019-05-15 RX ORDER — KETOROLAC TROMETHAMINE 10 MG/1
TABLET, FILM COATED ORAL
Refills: 0 | COMMUNITY
Start: 2019-05-13 | End: 2021-03-18

## 2019-05-15 RX ORDER — ATOMOXETINE 40 MG/1
40 CAPSULE ORAL DAILY
Qty: 30 CAP | Refills: 0 | Status: SHIPPED | OUTPATIENT
Start: 2019-05-15 | End: 2021-03-18

## 2019-05-15 RX ORDER — LIDOCAINE 50 MG/G
PATCH TOPICAL
Refills: 0 | COMMUNITY
Start: 2019-05-03 | End: 2019-05-15

## 2019-05-15 RX ORDER — SILVER SULFADIAZINE 10 G/1000G
CREAM TOPICAL
Refills: 0 | COMMUNITY
Start: 2019-05-03 | End: 2021-03-18

## 2019-05-15 RX ORDER — PRAZOSIN HYDROCHLORIDE 1 MG/1
CAPSULE ORAL
Refills: 0 | COMMUNITY
Start: 2019-05-02 | End: 2019-05-15

## 2019-05-15 NOTE — LETTER
Name:Carolina Abdi Oral WFR:[birth year] MR #:[id number] Provider Charo Hernandez MD  
*[id number]* BSMG-491 (5/16) Page 1 of 5 Initial [employer] CONTROLLED SUBSTANCE AGREEMENT I may be prescribed medications that are controlled substances as part  of my treatment plan for management of my medical condition(s). The goal of my treatment plan is to maintain and/or improve my health and wellbeing. Because controlled substances have an increased risk of abuse or harm, continual re-evaluation is needed determine if the goals of my treatment plan are being met for my safety and the safety of others. Patrizia Lock  am entering into this Controlled Substance Agreement with my provider, Duy Abdullahi MD at [facility] . I understand that successful treatment requires mutual trust and honesty between me and my provider. I understand that there are state and federal laws and regulations which apply to the medications that my provider may prescribe that must be followed. I understand there are risks and benefits ts of taking the medicines that my provider may prescribe. I understand and agree that following this Agreement is necessary in continuing my provider-patient relationship and success of my treatment plan. As a part of my treatment plan, I agree to the following: COMMUNICATION: 
 
1. I will communicate fully with my provider about my medical condition(s), including the effect on my daily life and how well my medications are helping. I will tell my provider all of the medications that I take for any reason, including medications I receive from another health care provider, and will notify my provider about all issues, problems or concerns, including any side effects, which may be related to my medications. I understand that this information allows my provider to adjust my treatment plan to help manage my medical condition.  I understand that this information will become part of my permanent medical record. 2. I will notify my provider if I have a history of alcohol/drug misuse/addiction or if I have had treatment for alcohol/drug addiction in the past, or if I have a new problem with or concern about alcohol/drug use/addiction, because this increases the likelihood of high risk behaviors and may lead to serious medical conditions. 3. Females Only: I will notify my provider if I am or become pregnant, or if I intend to become pregnant, or if I intend to breastfeed. I understand that communication of these issues with my provider is important, due to possible effects my medication could have on an unborn fetus or breastfeeding child. Name:. Samina Pickens [facility]:[birth year]44 MR #:[id number] Provider Graeme Sanderson MD  
*[id number]* BSMG-491 (5/16) Page 2 of 5 Initial [employer] MISUSE OF MEDICATIONS / DRUGS: 
 
1. I agree to take all controlled substances as prescribed, and will not misuse or abuse any controlled substances prescribed by my provider. For my safety, I will not increase the amount of medicine I take without first talking with and getting permission from my provider. 2. If I have a medical emergency, another health care provider may prescribe me medication. If I seek emergency treatment, I will notify my provider within seventy-two (72) hours. 3. I understand that my provider may discuss my use and/or possible misuse/abuse of controlled substances and alcohol, as appropriate, with any health care provider involved in my care, pharmacist or legal authority. ILLEGAL DRUGS: 
 
1. I will not use illegal drugs of any kind, including but not limited to marijuana, heroin, cocaine, or any prescription drug which is not prescribed to me. DRUG DIVERSION / PRESCRIPTION FRAUD: 
 
1. I will not share, sell, trade, give away, or otherwise misuse my prescriptions or medications. 2. I will not alter any prescriptions provided to me by my provider. SINGLE PROVIDER: 
 
1. I agree that all controlled substances that I take will be prescribed only by my provider (or his/her covering provider) under this Agreement. This agreement does not prevent me from seeking emergency medical treatment or receiving pain management related to a surgery. PROTECTING MEDICATIONS: 
 
1. I am responsible for keeping my prescriptions and medications in a safe and secure place including safeguarding them from loss or theft. I understand that lost, stolen or damaged/destroyed prescriptions or medications will not be replaced. Name:. Severa Murrain ALDAIR:[birth year] MR #:[id number] Provider Pineda Carbone MD  
*[id number]* BSMG-491 (5/16) Page 3 of 5 Initial [employer] PRESCRIPTION RENEWALS/REFILLS: 
 
1. I will follow my controlled substance medication schedule as prescribed by my provider. 2. I understand and agree that I will make any requests for renewals or refills of my prescriptions only at the time of an office visit or during my providers regular office hours subject to the prescription refill requirements of the individual practice. 3. I understand that my provider may not call in prescriptions for controlled substances to my pharmacy. 4. I understand that my provider may adjust or discontinue these medications as deemed appropriate for my medical treatment plan. This Agreement does not guarantee the prescription of controlled medications. 5. I agree that if my medications are adjusted or discontinued, I will properly dispose of any remaining medications. I understand that I will be required to dispose of any remaining controlled medications prior to being provided with any prescriptions for other controlled medications.  
 
 
1. I authorize my provider and my pharmacy to cooperate fully with any local, state, or federal law enforcement agency in the investigation of any possible misuse, sale, or other diversion of my controlled substance prescriptions or medications. RISKS: 
 
 
1. I understand that if I do not adhere to this Agreement in any way, my provider may change my prescriptions, stop prescribing controlled substances or end our provider-patient relationship. 2. If my provider decides to stop prescribing medication, or decides to end our provider-patient relationship,my provider may require that I taper my medications slowly. If necessary, my provider may also provide a prescription for other medications to treat my withdrawal symptoms. UNDERSTANDING THIS AGREEMENT: 
 
I understand that my provider may adjust or stop my prescriptions for controlled substances based on my medical condition and my treatment plan. I understand that this Agreement does not guarantee that I will be prescribed medications or controlled substances. I understand that controlled substances may be just one part 
of my treatment plan.  
 
My initial on each page and my signature below shows that I have read each page of this Agreement, I have had an opportunity to ask questions, and all of my questions have been answered to my satisfaction by my provider. By signing below, I agree to comply with this Agreement, and I understand that if I do not follow the Agreements listed above, my provider may stop 
 
 
 
_________________________________________  Date/Time 5/15/2019 3:35 PM   
             (Patient Signature)

## 2019-05-15 NOTE — PROGRESS NOTES
Going back to school month from now. Plans to study substance abuse counseling. Advised cannot prescribe controlled meds b/o h/o substance abuse and overdose. Requests meds for sleep. Rec benadryl, melatonin OTC. On Toradol for pain per ortho. Taken off opiates. Wants to get back on med for ADD. Pt states ADD since childhood. Dx by neuro. States also dx with bipolar but on no meds for this. Visit Vitals /81 (BP 1 Location: Left arm, BP Patient Position: Sitting) Pulse 91 Temp 97.3 °F (36.3 °C) (Oral) Resp 18 Ht 5' 1\" (1.549 m) Wt 131 lb 12.8 oz (59.8 kg) LMP 04/26/2019 (Approximate) SpO2 98% BMI 24.90 kg/m² HISTORY:  The patient is alert and cooperative. Reviewed above. ASSESSMENT:  Attention deficit disorder, off of meds. PLAN:  Will try Strattera at 40 mg. Follow-up through [facility] if benefit for continued script. Continue follow-up with psychiatrist for substance abuse, posttraumatic stress, bipolar. Recheck here otherwise p.r.n. TOTAL FACE TO FACE TIME OF 10 MINUTES SPENT WITH PATIENT. GREATER THAN 50% OF TIME WAS SPENT IN COUNSELING AND/OR COORDINATION OF CARE. SEE HPI, ASSESSMENT AND PLAN FOR DETAILS.

## 2019-05-15 NOTE — PROGRESS NOTES
Satnam Blank  Identified pt with two pt identifiers(name and ). Chief Complaint Patient presents with  New Patient [facility]  Medication Refill Adderall  Post Traumatic Stress Disorder 1. Have you been to the ER, urgent care clinic since your last visit? Hospitalized since your last visit? No 
 
2. Have you seen or consulted any other health care providers outside of the [address] [place] [address] since your last visit? Include any pap smears or colon screening. Yes, Lisa Martins Surgeon, Dr. Sabiha Wright at [facility] Would you like to sign up for MyChart today, if you have not already done so? No 
If not, would you like information on MyChart, and how to sign up at a later time? No 
 
 
Medication reconciliation up to date and corrected with patient at this time. Today's provider has been notified of reason for visit, vitals and flowsheets obtained on patients. Reviewed record in preparation for visit, huddled with provider and have obtained necessary documentation. Health Maintenance Due Topic  Pneumococcal 0-64 years (1 of 1 - PPSV23)  DTaP/Tdap/Td series (1 - Tdap)  PAP AKA CERVICAL CYTOLOGY Wt Readings from Last 3 Encounters:  
05/15/19 131 lb 12.8 oz (59.8 kg) 16 121 lb 14.4 oz (55.3 kg) 16 117 lb 1 oz (53.1 kg) Temp Readings from Last 3 Encounters:  
05/15/19 97.3 °F (36.3 °C) (Oral) 19 98.3 °F (36.8 °C)  
16 98.4 °F (36.9 °C) (Oral) BP Readings from Last 3 Encounters:  
05/15/19 114/81  
19 96/69  
16 97/64 Pulse Readings from Last 3 Encounters:  
05/15/19 91  
19 86  
16 91 Vitals:  
 05/15/19 1529 BP: 114/81 Pulse: 91  
Resp: 18 Temp: 97.3 °F (36.3 °C) TempSrc: Oral  
SpO2: 98% Weight: 131 lb 12.8 oz (59.8 kg) Height: 5' 1\" (1.549 m) PainSc:  10 - Worst pain ever PainLoc: Leg  
LMP: 2019 Learning Assessment: 
:  
 
 Learning Assessment 11/5/2014 PRIMARY LEARNER Patient HIGHEST LEVEL OF EDUCATION - PRIMARY LEARNER  GRADUATED HIGH SCHOOL OR GED  
BARRIERS PRIMARY LEARNER READING  
CO-LEARNER CAREGIVER No  
PRIMARY LANGUAGE ENGLISH  
LEARNER PREFERENCE PRIMARY DEMONSTRATION  
ANSWERED BY Patient RELATIONSHIP SELF Depression Screening: 
:  
 
3 most recent PHQ Screens 5/15/2019 Little interest or pleasure in doing things Not at all Feeling down, depressed, irritable, or hopeless Not at all Total Score PHQ 2 0 Fall Risk Assessment: 
:  
 
Fall Risk Assessment, last 12 mths 5/15/2019 Able to walk? Yes Fall in past 12 months? Yes Fall with injury? Yes  
Number of falls in past 12 months 3 Fall Risk Score 4 Abuse Screening: 
:  
 
Abuse Screening Questionnaire 5/15/2019 Do you ever feel afraid of your partner? Radha Alejo Are you in a relationship with someone who physically or mentally threatens you? [place] Alejo Is it safe for you to go home? Y  
 
 
ADL Screening: 
:  
 
ADL Assessment 5/15/2019 Feeding yourself No Help Needed Getting from bed to chair No Help Needed Getting dressed No Help Needed Bathing or showering No Help Needed Walk across the room (includes cane/walker) Help Needed Using the telphone No Help Needed Taking your medications No Help Needed Preparing meals Help Needed Managing money (expenses/bills) No Help Needed Moderately strenuous housework (laundry) Help Needed Shopping for personal items (toiletries/medicines) No Help Needed Shopping for groceries Help Needed Driving No Help Needed Climbing a flight of stairs Help Needed Getting to places beyond walking distances Help Needed

## 2019-10-14 ENCOUNTER — HOSPITAL ENCOUNTER (EMERGENCY)
Age: 29
Discharge: HOME | End: 2019-10-14
Attending: EMERGENCY MEDICINE
Payer: MEDICAID

## 2019-10-14 VITALS
SYSTOLIC BLOOD PRESSURE: 120 MMHG | RESPIRATION RATE: 18 BRPM | HEART RATE: 104 BPM | TEMPERATURE: 98.3 F | DIASTOLIC BLOOD PRESSURE: 81 MMHG | OXYGEN SATURATION: 98 %

## 2019-10-14 DIAGNOSIS — Z78.9 ALCOHOL USE: ICD-10-CM

## 2019-10-14 DIAGNOSIS — F11.93 OPIOID WITHDRAWAL (HCC): Primary | ICD-10-CM

## 2019-10-14 PROCEDURE — 96374 THER/PROPH/DIAG INJ IV PUSH: CPT

## 2019-10-14 PROCEDURE — 74011250636 HC RX REV CODE- 250/636: Performed by: EMERGENCY MEDICINE

## 2019-10-14 PROCEDURE — 99284 EMERGENCY DEPT VISIT MOD MDM: CPT

## 2019-10-14 PROCEDURE — 74011250637 HC RX REV CODE- 250/637: Performed by: EMERGENCY MEDICINE

## 2019-10-14 RX ORDER — LOPERAMIDE HYDROCHLORIDE 2 MG/1
2 CAPSULE ORAL
Qty: 20 CAP | Refills: 0 | Status: SHIPPED | OUTPATIENT
Start: 2019-10-14 | End: 2019-10-24

## 2019-10-14 RX ORDER — DICYCLOMINE HYDROCHLORIDE 20 MG/1
20 TABLET ORAL EVERY 6 HOURS
Qty: 20 TAB | Refills: 0 | Status: SHIPPED | OUTPATIENT
Start: 2019-10-14 | End: 2019-10-19

## 2019-10-14 RX ORDER — TRAZODONE HYDROCHLORIDE 50 MG/1
50 TABLET ORAL
Qty: 6 TAB | Refills: 0 | Status: SHIPPED | OUTPATIENT
Start: 2019-10-14 | End: 2021-03-18

## 2019-10-14 RX ORDER — ONDANSETRON 4 MG/1
8 TABLET, ORALLY DISINTEGRATING ORAL
Status: COMPLETED | OUTPATIENT
Start: 2019-10-14 | End: 2019-10-14

## 2019-10-14 RX ORDER — FLUOXETINE 20 MG/1
20 TABLET ORAL DAILY
COMMUNITY
End: 2021-03-18

## 2019-10-14 RX ORDER — HYDROXYZINE 25 MG/1
25 TABLET, FILM COATED ORAL
Status: COMPLETED | OUTPATIENT
Start: 2019-10-14 | End: 2019-10-14

## 2019-10-14 RX ORDER — KETOROLAC TROMETHAMINE 30 MG/ML
30 INJECTION, SOLUTION INTRAMUSCULAR; INTRAVENOUS
Status: COMPLETED | OUTPATIENT
Start: 2019-10-14 | End: 2019-10-14

## 2019-10-14 RX ORDER — ONDANSETRON 8 MG/1
8 TABLET, ORALLY DISINTEGRATING ORAL
Qty: 12 TAB | Refills: 0 | OUTPATIENT
Start: 2019-10-14 | End: 2020-12-18

## 2019-10-14 RX ORDER — DICYCLOMINE HYDROCHLORIDE 10 MG/1
10 CAPSULE ORAL
Status: COMPLETED | OUTPATIENT
Start: 2019-10-14 | End: 2019-10-14

## 2019-10-14 RX ADMIN — KETOROLAC TROMETHAMINE 30 MG: 30 INJECTION, SOLUTION INTRAMUSCULAR at 07:47

## 2019-10-14 RX ADMIN — HYDROXYZINE HYDROCHLORIDE 25 MG: 25 TABLET, FILM COATED ORAL at 07:51

## 2019-10-14 RX ADMIN — DICYCLOMINE HYDROCHLORIDE 10 MG: 10 CAPSULE ORAL at 07:51

## 2019-10-14 RX ADMIN — ONDANSETRON 8 MG: 4 TABLET, ORALLY DISINTEGRATING ORAL at 07:46

## 2019-10-14 NOTE — DISCHARGE INSTRUCTIONS
Patient Education        Learning About Medication-Assisted Treatment for Opioid Use Disorder  What is opioid use disorder? Opioid use disorder means that a person uses opioids even though it causes harm to themselves or others. It can range from mild to severe. The more signs of this disorder you have, the more severe it may be. Moderate to severe opioid use disorder is sometimes called addiction. People who have it may find it hard to control their use. With this disorder, you may get strong cravings for opioids. You may need more and more of the opioid to get the same effect. This is called tolerance. Your body may also get used to opioids. This is called physical dependence. If you stop using opioids, you may have uncomfortable symptoms (withdrawal). This disorder can develop from the use of any type of opioid. Prescription ones include hydrocodone, oxycodone, fentanyl, and morphine. Heroin is an example of an illegal opioid. What is medication-assisted treatment? Medication-assisted treatment, or MAT, involves taking a medicine in place of the opioid you were using. The medicines used in MAT include:  · Buprenorphine, such as Subutex. This medicine works by targeting the same places in the brain that opioids do. · Methadone, such as Dolophine. It stops you from getting the quick \"high\" of opioids. · Naltrexone, such as ReVia. It prevents the feelings of pleasure you get from using an opioid. This medicine is only given after you have gone through withdrawal.  MAT can work in different ways, depending on which medicine you take. Sometimes it can help with withdrawal symptoms. Other times it helps you manage cravings. When you use MAT, you no longer think all the time about how to get or use opioids. You are more able to focus on getting better. You can work on how you will stay away from using opioids. What is it like to take this medicine?   Because these medicines are very powerful, doctors follow a strict set of rules for MAT. This is to make sure the medicines are used safely. Your doctor may have you sign a written agreement to take your medicine exactly as he or she tells you to. Hema Billings also agree to be careful with it and not share it with others. If you don't follow the agreement, your doctor may not be willing to keep treating you. Depending on the medicine being used, you will probably take a pill every day, at least at first. With some medicines, you may be able to take them less often after a while. You may have to go to the doctor's office or a clinic to get your medicine each time. Your dose may need to be adjusted up or down at first.  Some people have side effects, but they're usually minor. You might take the new drug for months, years, or even for life. Along with MAT, counseling and education are also an important part of treatment for opioid use disorder. Follow-up care is a key part of your treatment and safety. Be sure to make and go to all appointments, and call your doctor if you are having problems. It's also a good idea to know your test results and keep a list of the medicines you take. Where can you learn more? Go to http://nav-torsten.info/. Enter Z364 in the search box to learn more about \"Learning About Medication-Assisted Treatment for Opioid Use Disorder. \"  Current as of: February 5, 2019  Content Version: 12.2  © 9005-2539 [employer]. Care instructions adapted under license by [employer] (which disclaims liability or warranty for this information). If you have questions about a medical condition or this instruction, always ask your healthcare professional. [place] [address] any warranty or liability for your use of this information. Patient Education        Opioid Withdrawal: Care Instructions  Overview  Opioids are strong pain medicines.  Examples of prescription opioids include hydrocodone, oxycodone, fentanyl, and morphine. Heroin is an example of an illegal opioid. Your body gets used to opioids if you take them all the time. This is called physical dependence. And when you stop using opioids or use less, you go through withdrawal.  Withdrawal symptoms can include nausea, sweating, chills, diarrhea, stomach cramps, and muscle aches. Withdrawal can last up to several weeks. It depends on which opioid you took and how long you took it. You may feel very ill, but you probably aren't in medical danger. Withdrawal isn't easy, but there are things you can do to help you cope with the symptoms. You will feel a little bit better each day as your body adjusts and heals itself. Follow-up care is a key part of your treatment and safety. Be sure to make and go to all appointments, and call your doctor if you are having problems. It's also a good idea to know your test results and keep a list of the medicines you take. How can you care for yourself at home? · Your doctor may give you medicine to help you feel better. Read and follow all instructions on the label. · To help get through withdrawal, you can also:  ? Get plenty of rest.  ? Drink plenty of fluids. ? Stay active, but don't tire yourself. ? Eat a healthy diet. · Do not drink alcohol or take illegal drugs. · Do not take medications that make you tired, like sleeping pills or muscle relaxers. · Talk to your doctor about drug treatment programs to help you stay drug-free. · Talk to your doctor or pharmacist about having a naloxone rescue kit on hand. Remember after you stop taking an opioid, even for a short time, your body gets used to not having this type of drug. If you return to taking the same amount of an opioid as you did before you stopped, you could be at a higher risk for overdose. When should you call for help? Call 911 anytime you think you may need emergency care.  For example, call if:    · You feel you cannot stop from hurting yourself or someone else.    Call your doctor now or seek immediate medical care if:    · You have new or worse withdrawal symptoms that you can't manage at home, such as:  ? Stomach cramps. ? Vomiting. ? Diarrhea. ? Muscle aches. ? Sweating.    Watch closely for changes in your health, and be sure to contact your doctor if:    · You do not get better as expected. Where can you learn more? Go to http://nav-torsten.info/. Enter E242 in the search box to learn more about \"Opioid Withdrawal: Care Instructions. \"  Current as of: February 5, 2019  Content Version: 12.2  © 1712-1350 [employer]. Care instructions adapted under license by [employer] (which disclaims liability or warranty for this information). If you have questions about a medical condition or this instruction, always ask your healthcare professional. [address] any warranty or liability for your use of this information.

## 2019-10-14 NOTE — ED TRIAGE NOTES
Pt arrives to the ed via ems for c/o suboxone withdrawal. Per pt she stopped taking it last Tuesday. She states \"I was just trying to get off of it\". Per pt she also drinks alcohol daily and states her last drink was approx 1 hour ago.

## 2019-10-14 NOTE — ED PROVIDER NOTES
EMERGENCY DEPARTMENT HISTORY AND PHYSICAL EXAM      Date: 10/14/2019  Patient Name: Alexandr Lambert    Please note that this dictation was completed with iVengo, the computer voice recognition software. Quite often unanticipated grammatical, syntax, homophones, and other interpretive errors are inadvertently transcribed by the computer software. Please disregard these errors. Please excuse any errors that have escaped final proofreading. History of Presenting Illness     Chief Complaint   Patient presents with    Withdrawal       History Provided By: Patient    HPI: Alexandr Lambert, 34 y.o. female, with a past medical history significant for opiate abuse presented the emergency department complaining of opiate withdrawal.  Patient takes Suboxone, she last used 6 days ago, she was trying to get off and did not go to clinic. Admits to nausea and vomiting. Admits to diarrhea. Admits to muscle aches, fatigue, abdominal pain, nausea, vomiting, diarrhea. She states last night she was unable to sleep due to restlessness which prompted her visit here. She does admit to drinking alcohol prior to arrival.  Denies chance of pregnancy. She is tried ibuprofen, Tylenol for her symptoms. PCP: Polly Arevalo MD    No current facility-administered medications on file prior to encounter. Current Outpatient Medications on File Prior to Encounter   Medication Sig Dispense Refill    amoxicillin-clavulanate (AUGMENTIN) 500-125 mg per tablet take 1 tablet by mouth every 8 hours until finished  0    ketorolac (TORADOL) 10 mg tablet take 1 tablet by mouth every 6 hours  0    gabapentin (NEURONTIN) 100 mg capsule TAKE 2 CAPSULES BY MOUTH 3 TIMES A DAY  0    SSD 1 % topical cream APPLY TO AFFECTED AREA DAILY  0    atomoxetine (STRATTERA) 40 mg capsule Take 1 Cap by mouth daily. 30 Cap 0    acetaminophen (TYLENOL) 325 mg tablet Take  by mouth every four (4) hours as needed.          Past History     Past Medical History:  Past Medical History:   Diagnosis Date    ADHD 10    Dr. Jace Robertson    ADHD (attention deficit hyperactivity disorder) 9/10/2009    Advanced care planning/counseling discussion 5/3/16    Anemia Before Preg. Not taking Iron   Chelita Valeriano Gave birth to child recently [id number]    Baby Boy weighed 4 lb 15 oz    HX OTHER MEDICAL     history of viral meningitis 1990    HX OTHER MEDICAL     stillbirth 45 wks 12/2010    HX OTHER MEDICAL 2008? Achilles tendonitis    Opiate dependence ([address]     started after ankle injury       Past Surgical History:  Past Surgical History:   Procedure Laterality Date    HX GYN      3 vaginal births       Family History:  Family History   Problem Relation Age of Onset    Hypertension Father     Hypertension Mother     Bipolar Disorder Mother     Cancer Maternal Grandfather     Heart Disease Paternal Grandmother     Bipolar Disorder Maternal Aunt        Social History:  Social History     Tobacco Use    Smoking status: Current Every Day Smoker     Packs/day: 1.00     Years: 5.00     Pack years: 5.00     Types: Cigarettes    Smokeless tobacco: Never Used    Tobacco comment: will quit with    Substance Use Topics    Alcohol use: No     Alcohol/week: 0.0 standard drinks     Comment: prior drinking to intoxication.  Drug use: No     Types: Opiates, Other, Marijuana, Prescription     [facility]       Allergies:  No Known Allergies      Review of Systems   Review of Systems   Constitutional: Positive for chills and fatigue. Negative for fever. HENT: Negative for congestion and sore throat. Eyes: Negative for visual disturbance. Respiratory: Negative for cough and shortness of breath. Cardiovascular: Negative for chest pain and leg swelling. Gastrointestinal: Positive for abdominal pain, diarrhea, nausea and vomiting. Negative for blood in stool. Endocrine: Negative for polyuria.    Genitourinary: Negative for dysuria, flank pain, vaginal bleeding and vaginal discharge. Musculoskeletal: Positive for back pain and myalgias. Negative for arthralgias. Skin: Negative for rash. Allergic/Immunologic: Negative for immunocompromised state. Neurological: Negative for weakness and headaches. Psychiatric/Behavioral: Positive for sleep disturbance. Negative for confusion. The patient is nervous/anxious. Physical Exam   Physical Exam   Constitutional: She is oriented to person, place, and time. She appears well-developed and well-nourished. HENT:   Head: Normocephalic and atraumatic. Moist mucous membranes, poor dentition   Eyes: Pupils are equal, round, and reactive to light. Conjunctivae are normal. Right eye exhibits no discharge. Left eye exhibits no discharge. Neck: Normal range of motion. Neck supple. No tracheal deviation present. Cardiovascular: Normal rate, regular rhythm and normal heart sounds. No murmur heard. Pulmonary/Chest: Effort normal and breath sounds normal. No respiratory distress. She has no wheezes. She has no rales. Abdominal: Soft. Bowel sounds are normal. There is no tenderness. There is no rebound and no guarding. Musculoskeletal: Normal range of motion. She exhibits no edema, tenderness or deformity. Neurological: She is alert and oriented to person, place, and time. Skin: Skin is warm and dry. No rash noted. No erythema. Psychiatric: Her behavior is normal.   Nursing note and vitals reviewed. Diagnostic Study Results     Labs -   No results found for this or any previous visit (from the past 12 hour(s)). Radiologic Studies -   No orders to display     CT Results  (Last 48 hours)    None        CXR Results  (Last 48 hours)    None            Medical Decision Making   I am the first provider for this patient. I reviewed the vital signs, available nursing notes, past medical history, past surgical history, family history and social history.     Vital Signs-Reviewed the patient's vital signs. Patient Vitals for the past 12 hrs:   Temp Pulse Resp BP SpO2   10/14/19 0657 98.3 °F (36.8 °C) 69 16 130/86 97 %           Records Reviewed: Nursing Notes and Old Medical Records    Provider Notes (Medical Decision Making):   Consistent with opioid abuse and opiate withdrawal.  Will provide symptomatic therapy. Does not have a high COW score at this time, no need for ED initiation of buprenorphine. ED Course:   Initial assessment performed. The patients presenting problems have been discussed, and they are in agreement with the care plan formulated and outlined with them. I have encouraged them to ask questions as they arise throughout their visit. Critical Care Time:   none    Disposition:  DISCHARGE NOTE  Patients results have been reviewed with them. Patient and/or family have verbally conveyed their understanding and agreement of the patient's signs, symptoms, diagnosis, treatment and prognosis and additionally agree to follow up as recommended or return to the Emergency Room should their condition change or have any new concerns prior to their follow-up appointment. Patient verbally agrees with the care-plan and verbally conveys that all of their questions have been answered. Discharge instructions have also been provided to the patient with some educational information regarding their diagnosis as well a list of reasons why they would want to return to the ER prior to their follow-up appointment should their condition change. PLAN:  1. Current Discharge Medication List      START taking these medications    Details   naloxone (EVZIO) 0.4 mg/0.4 mL auto-injector 0.4 mL by IntraMUSCular route once as needed for Overdose for up to 1 dose. Qty: 1 Device, Refills: 0      dicyclomine (BENTYL) 20 mg tablet Take 1 Tab by mouth every six (6) hours for 20 doses.   Qty: 20 Tab, Refills: 0      ondansetron (ZOFRAN ODT) 8 mg disintegrating tablet Take 1 Tab by mouth every eight (8) hours as needed for Nausea. Qty: 12 Tab, Refills: 0      loperamide (IMODIUM) 2 mg capsule Take 1 Cap by mouth four (4) times daily as needed for Diarrhea for up to 10 days. Qty: 20 Cap, Refills: 0      traZODone (DESYREL) 50 mg tablet Take 1 Tab by mouth nightly. Qty: 6 Tab, Refills: 0           2. Follow-up Information     Follow up With Specialties Details Why [id number]  Schedule an appointment as soon as possible for a visit  [facility]  [phone]    [address]  Schedule an appointment as soon as possible for a visit  [address]    [facility] EMERGENCY DEPT Emergency Medicine  If symptoms worsen [address] [place] [address]  [address]  [phone]          Return to ED if worse     Diagnosis     Clinical Impression:   1. Opioid withdrawal ([address]    2. Alcohol use        Attestations:   This note was completed by Alysha Daly DO

## 2020-12-18 ENCOUNTER — HOSPITAL ENCOUNTER (EMERGENCY)
Age: 30
Discharge: HOME | End: 2020-12-18
Attending: EMERGENCY MEDICINE
Payer: MEDICAID

## 2020-12-18 VITALS
HEART RATE: 62 BPM | RESPIRATION RATE: 16 BRPM | DIASTOLIC BLOOD PRESSURE: 54 MMHG | OXYGEN SATURATION: 100 % | TEMPERATURE: 98.5 F | SYSTOLIC BLOOD PRESSURE: 100 MMHG

## 2020-12-18 DIAGNOSIS — F19.10 POLYSUBSTANCE ABUSE (HCC): ICD-10-CM

## 2020-12-18 DIAGNOSIS — R11.2 NAUSEA AND VOMITING IN ADULT: ICD-10-CM

## 2020-12-18 DIAGNOSIS — K29.00 ACUTE GASTRITIS WITHOUT HEMORRHAGE, UNSPECIFIED GASTRITIS TYPE: Primary | ICD-10-CM

## 2020-12-18 LAB
ALBUMIN SERPL-MCNC: 4.4 G/DL (ref 3.5–5)
ALBUMIN/GLOB SERPL: 1 {RATIO} (ref 1.1–2.2)
ALP SERPL-CCNC: 112 U/L (ref 45–117)
ALT SERPL-CCNC: 11 U/L (ref 12–78)
AMPHET UR QL SCN: NEGATIVE
ANION GAP SERPL CALC-SCNC: 13 MMOL/L (ref 5–15)
APPEARANCE UR: ABNORMAL
AST SERPL-CCNC: 30 U/L (ref 15–37)
BACTERIA URNS QL MICRO: ABNORMAL /HPF
BARBITURATES UR QL SCN: NEGATIVE
BASOPHILS # BLD: 0 K/UL (ref 0–0.1)
BASOPHILS NFR BLD: 0 % (ref 0–1)
BENZODIAZ UR QL: NEGATIVE
BILIRUB SERPL-MCNC: 1.1 MG/DL (ref 0.2–1)
BILIRUB UR QL CFM: NEGATIVE
BUN SERPL-MCNC: 18 MG/DL (ref 6–20)
BUN/CREAT SERPL: 20 (ref 12–20)
CALCIUM SERPL-MCNC: 9.8 MG/DL (ref 8.5–10.1)
CANNABINOIDS UR QL SCN: POSITIVE
CHLORIDE SERPL-SCNC: 96 MMOL/L (ref 97–108)
CO2 SERPL-SCNC: 27 MMOL/L (ref 21–32)
COCAINE UR QL SCN: NEGATIVE
COLOR UR: ABNORMAL
CREAT SERPL-MCNC: 0.88 MG/DL (ref 0.55–1.02)
DIFFERENTIAL METHOD BLD: ABNORMAL
DRUG SCRN COMMENT,DRGCM: ABNORMAL
EOSINOPHIL # BLD: 0 K/UL (ref 0–0.4)
EOSINOPHIL NFR BLD: 0 % (ref 0–7)
EPITH CASTS URNS QL MICRO: ABNORMAL /LPF
ERYTHROCYTE [DISTWIDTH] IN BLOOD BY AUTOMATED COUNT: 13.2 % (ref 11.5–14.5)
ETHANOL SERPL-MCNC: <10 MG/DL
GLOBULIN SER CALC-MCNC: 4.4 G/DL (ref 2–4)
GLUCOSE SERPL-MCNC: 112 MG/DL (ref 65–100)
GLUCOSE UR STRIP.AUTO-MCNC: NEGATIVE MG/DL
HCG UR QL: NEGATIVE
HCT VFR BLD AUTO: 47.8 % (ref 35–47)
HGB BLD-MCNC: 16.2 G/DL (ref 11.5–16)
HGB UR QL STRIP: ABNORMAL
IMM GRANULOCYTES # BLD AUTO: 0.1 K/UL (ref 0–0.04)
IMM GRANULOCYTES NFR BLD AUTO: 0 % (ref 0–0.5)
KETONES UR QL STRIP.AUTO: >80 MG/DL
LEUKOCYTE ESTERASE UR QL STRIP.AUTO: NEGATIVE
LIPASE SERPL-CCNC: 54 U/L (ref 73–393)
LYMPHOCYTES # BLD: 1.9 K/UL (ref 0.8–3.5)
LYMPHOCYTES NFR BLD: 14 % (ref 12–49)
MCH RBC QN AUTO: 30.6 PG (ref 26–34)
MCHC RBC AUTO-ENTMCNC: 33.9 G/DL (ref 30–36.5)
MCV RBC AUTO: 90.4 FL (ref 80–99)
METHADONE UR QL: NEGATIVE
MONOCYTES # BLD: 0.7 K/UL (ref 0–1)
MONOCYTES NFR BLD: 5 % (ref 5–13)
MUCOUS THREADS URNS QL MICRO: ABNORMAL /LPF
NEUTS SEG # BLD: 10.6 K/UL (ref 1.8–8)
NEUTS SEG NFR BLD: 81 % (ref 32–75)
NITRITE UR QL STRIP.AUTO: NEGATIVE
NRBC # BLD: 0 K/UL (ref 0–0.01)
NRBC BLD-RTO: 0 PER 100 WBC
OPIATES UR QL: POSITIVE
PCP UR QL: NEGATIVE
PH UR STRIP: 8 [PH] (ref 5–8)
PLATELET # BLD AUTO: 295 K/UL (ref 150–400)
PMV BLD AUTO: 10.6 FL (ref 8.9–12.9)
POTASSIUM SERPL-SCNC: 3.2 MMOL/L (ref 3.5–5.1)
PROT SERPL-MCNC: 8.8 G/DL (ref 6.4–8.2)
PROT UR STRIP-MCNC: 30 MG/DL
RBC # BLD AUTO: 5.29 M/UL (ref 3.8–5.2)
RBC #/AREA URNS HPF: ABNORMAL /HPF (ref 0–5)
SODIUM SERPL-SCNC: 136 MMOL/L (ref 136–145)
SP GR UR REFRACTOMETRY: 1.01 (ref 1–1.03)
UR CULT HOLD, URHOLD: NORMAL
UROBILINOGEN UR QL STRIP.AUTO: 4 EU/DL (ref 0.2–1)
WBC # BLD AUTO: 13.2 K/UL (ref 3.6–11)
WBC URNS QL MICRO: ABNORMAL /HPF (ref 0–4)

## 2020-12-18 PROCEDURE — 36415 COLL VENOUS BLD VENIPUNCTURE: CPT

## 2020-12-18 PROCEDURE — 83690 ASSAY OF LIPASE: CPT

## 2020-12-18 PROCEDURE — 85025 COMPLETE CBC W/AUTO DIFF WBC: CPT

## 2020-12-18 PROCEDURE — 81025 URINE PREGNANCY TEST: CPT

## 2020-12-18 PROCEDURE — 96361 HYDRATE IV INFUSION ADD-ON: CPT

## 2020-12-18 PROCEDURE — 81001 URINALYSIS AUTO W/SCOPE: CPT

## 2020-12-18 PROCEDURE — 99284 EMERGENCY DEPT VISIT MOD MDM: CPT

## 2020-12-18 PROCEDURE — 80053 COMPREHEN METABOLIC PANEL: CPT

## 2020-12-18 PROCEDURE — 74011250636 HC RX REV CODE- 250/636: Performed by: EMERGENCY MEDICINE

## 2020-12-18 PROCEDURE — 96374 THER/PROPH/DIAG INJ IV PUSH: CPT

## 2020-12-18 PROCEDURE — 80307 DRUG TEST PRSMV CHEM ANLYZR: CPT

## 2020-12-18 RX ORDER — FAMOTIDINE 20 MG/1
20 TABLET, FILM COATED ORAL 2 TIMES DAILY
Qty: 20 TAB | Refills: 0 | Status: SHIPPED | OUTPATIENT
Start: 2020-12-18 | End: 2020-12-28

## 2020-12-18 RX ORDER — ONDANSETRON 4 MG/1
4 TABLET, ORALLY DISINTEGRATING ORAL
Qty: 10 TAB | Refills: 0 | Status: SHIPPED | OUTPATIENT
Start: 2020-12-18 | End: 2021-03-18 | Stop reason: SDUPTHER

## 2020-12-18 RX ORDER — ONDANSETRON 2 MG/ML
4 INJECTION INTRAMUSCULAR; INTRAVENOUS
Status: COMPLETED | OUTPATIENT
Start: 2020-12-18 | End: 2020-12-18

## 2020-12-18 RX ADMIN — SODIUM CHLORIDE 1000 ML: 9 INJECTION, SOLUTION INTRAVENOUS at 12:09

## 2020-12-18 RX ADMIN — ONDANSETRON 4 MG: 2 INJECTION INTRAMUSCULAR; INTRAVENOUS at 12:09

## 2020-12-18 NOTE — ED TRIAGE NOTES
Patient arriving with c/o vomiting 5-6 (>5 times). Patient reports dysuria x 2-3 days. Patient reports heroin use today and that hasn't helped.

## 2020-12-18 NOTE — ED PROVIDER NOTES
24-year-old female with a history of IV drug abuse and opiate abuse, ADHD, PTSD, presents to the emergency department by EMS stating that she has been nauseous and vomiting for the last 5 to 6 days and had difficulty tolerating p.o. she also notes approximately 2 to 3-day history of dysuria and urinary frequency feeling similar to urinary tract infection. She notes epigastric abdominal burning pain that she rates as 7/10. She denies any fever, diarrhea, cough, shortness of breath or any other medical concerns. States that she took a dose of Pepto-Bismol about 4 hours prior to arrival which slightly improved her symptoms. She also states that she last used heroin earlier today and states that that did not help her symptoms either. She also states that she had to urinate before EMS arrived and when she heard the ambulance approaching she urinated on herself and now requests a shower. Past Medical History:   Diagnosis Date    ADHD 8    Dr. Piyush Mensah    ADHD (attention deficit hyperactivity disorder) 9/10/2009    Advanced care planning/counseling discussion 5/3/16    Anemia Before Preg. Not taking Iron   [facility] Gave birth to child recently [id number]    Baby Boy weighed 4 lb 15 oz    HX OTHER MEDICAL     history of viral meningitis 1990    HX OTHER MEDICAL     stillbirth 45 wks 12/2010    HX OTHER MEDICAL 2008?     Achilles tendonitis    Opiate dependence ([facility] [address] [facility]     started after ankle injury       Past Surgical History:   Procedure Laterality Date    HX GYN      3 vaginal births         Family History:   Problem Relation Age of Onset    Hypertension Father     Hypertension Mother     Bipolar Disorder Mother     Cancer Maternal Grandfather     Heart Disease Paternal Grandmother     Bipolar Disorder Maternal Aunt        Social History     Socioeconomic History    Marital status: LEGALLY      Spouse name: Not on file    Number of children: Not on file    Years of education: Not on file    Highest education level: Not on file   Occupational History    Occupation: , [employer]. Social Needs    Financial resource strain: Not on file    Food insecurity     Worry: Not on file     Inability: Not on file    Transportation needs     Medical: Not on file     Non-medical: Not on file   Tobacco Use    Smoking status: Current Every Day Smoker     Packs/day: 1.00     Years: 5.00     Pack years: 5.00     Types: Cigarettes    Smokeless tobacco: Never Used    Tobacco comment: will quit with    Substance and Sexual Activity    Alcohol use: No     Alcohol/week: 0.0 standard drinks     Comment: prior drinking to intoxication.  Drug use: No     Types: Opiates, Other, Marijuana, Prescription     [facility]: [facility]-Last day 10/13/15. [address]    Sexual activity: Yes     Partners: Male     Birth control/protection: None   Lifestyle    Physical activity     Days per week: Not on file     Minutes per session: Not on file    Stress: Not on file   Relationships    Social connections     Talks on phone: Not on file     Gets together: Not on file     Attends Jehovah's witness service: Not on file     Active member of club or organization: Not on file     Attends meetings of clubs or organizations: Not on file     Relationship status: Not on file    Intimate partner violence     Fear of current or ex partner: Not on file     Emotionally abused: Not on file     Physically abused: Not on file     Forced sexual activity: Not on file   Other Topics Concern    Not on file   Social History Narrative    Not on file         ALLERGIES: Patient has no known allergies. Review of Systems   Constitutional: Positive for activity change and appetite change. Negative for chills and fever. HENT: Negative for congestion, rhinorrhea, sinus pressure, sneezing and sore throat. Eyes: Negative for photophobia and visual disturbance. Respiratory: Negative for cough and shortness of breath. Cardiovascular: Negative for chest pain. Gastrointestinal: Positive for abdominal pain, nausea and vomiting. Negative for blood in stool, constipation and diarrhea. Genitourinary: Positive for dysuria, frequency and urgency. Negative for difficulty urinating, flank pain, hematuria, menstrual problem, vaginal bleeding and vaginal discharge. Musculoskeletal: Negative for arthralgias, back pain, myalgias and neck pain. Skin: Negative for rash and wound. Neurological: Negative for syncope, weakness, numbness and headaches. Psychiatric/Behavioral: Negative for self-injury and suicidal ideas. All other systems reviewed and are negative. Vitals:    12/18/20 1113   BP: (!) 128/95   Pulse: 65   Resp: 18   Temp: 98.2 °F (36.8 °C)   SpO2: 95%            Physical Exam  Vitals signs and nursing note reviewed. Constitutional:       General: She is not in acute distress. Appearance: Normal appearance. She is well-developed. She is not diaphoretic. Comments: Disheveled appearing   HENT:      Head: Normocephalic and atraumatic. Nose: Nose normal.   Eyes:      Extraocular Movements: Extraocular movements intact. Conjunctiva/sclera: Conjunctivae normal.      Pupils: Pupils are equal, round, and reactive to light. Neck:      Musculoskeletal: Neck supple. Cardiovascular:      Rate and Rhythm: Normal rate and regular rhythm. Heart sounds: Normal heart sounds. Pulmonary:      Effort: Pulmonary effort is normal.      Breath sounds: Normal breath sounds. Abdominal:      General: There is no distension. Palpations: Abdomen is soft. Tenderness: There is abdominal tenderness in the epigastric area. There is no right CVA tenderness, left CVA tenderness, guarding or rebound. Musculoskeletal:         General: No tenderness. Skin:     General: Skin is warm and dry. Neurological:      General: No focal deficit present.       Mental Status: She is alert and oriented to person, place, and time. Cranial Nerves: No cranial nerve deficit. Sensory: No sensory deficit. Motor: No weakness. Coordination: Coordination normal.          MDM   66-year-old female with history of polysubstance abuse presents with nausea, vomiting, epigastric pain and dysuria, also reportedly urinated on herself prior to arrival and requests shower which she did in the ER. Labs returned showing mild WBC of 13.2, otherwise reassuring. UA shows no evidence of UTI, does show significant epithelial contamination. HCG negative  UDS positive for opiates and THC. She was treated symptomatically in the ED and had improvement in her symptoms and was able to tolerate some p.o. soda. He was recommended substance abuse cessation. Will Rx Zofran and Pepcid for further symptomatic relief and recommended emphasis on p.o. hydration with gradual reintroduction of normal diet. Recommended PCP follow-up and return precautions were given for worsening or concerns.     Procedures

## 2020-12-18 NOTE — ED NOTES
11:45 AM Patient provided with warm shower. Reports that she urinated on herself prior to ambulance picking her up. 12:00 AM Patient urine collected; PIV started, medicated for nausea. Would like to wait for GI Cocktail until the medication starts working. Reports that usually zofran doesn't work and would like to know if she could get phenergan in that case. Also reporting increased anxiety and would like Ativan. Pamella Pool MD notified.

## 2021-03-18 ENCOUNTER — VIRTUAL VISIT (OUTPATIENT)
Dept: INTERNAL MEDICINE CLINIC | Age: 31
End: 2021-03-18
Payer: MEDICAID

## 2021-03-18 DIAGNOSIS — Z86.718 HISTORY OF DVT (DEEP VEIN THROMBOSIS): ICD-10-CM

## 2021-03-18 DIAGNOSIS — Z72.0 TOBACCO ABUSE: ICD-10-CM

## 2021-03-18 DIAGNOSIS — F41.9 ANXIETY: ICD-10-CM

## 2021-03-18 DIAGNOSIS — F43.10 PTSD (POST-TRAUMATIC STRESS DISORDER): Primary | ICD-10-CM

## 2021-03-18 DIAGNOSIS — F11.20 METHADONE MAINTENANCE THERAPY PATIENT (HCC): ICD-10-CM

## 2021-03-18 PROCEDURE — 99203 OFFICE O/P NEW LOW 30 MIN: CPT | Performed by: PHYSICIAN ASSISTANT

## 2021-03-18 RX ORDER — NALOXONE HYDROCHLORIDE 0.4 MG/ML
0.4 INJECTION, SOLUTION INTRAMUSCULAR; INTRAVENOUS; SUBCUTANEOUS
Qty: 1 ML | Refills: 3 | Status: SHIPPED | OUTPATIENT
Start: 2021-03-18

## 2021-03-18 RX ORDER — METHADONE HYDROCHLORIDE 10 MG/1
TABLET ORAL
COMMUNITY

## 2021-03-18 RX ORDER — BUPROPION HYDROCHLORIDE 150 MG/1
150 TABLET ORAL
Qty: 30 TAB | Refills: 5 | Status: SHIPPED | OUTPATIENT
Start: 2021-03-18

## 2021-03-18 RX ORDER — NALOXONE HYDROCHLORIDE 4 MG/.1ML
SPRAY NASAL
COMMUNITY
Start: 2021-02-26 | End: 2021-03-18 | Stop reason: SDUPTHER

## 2021-03-18 RX ORDER — ONDANSETRON 4 MG/1
4 TABLET, ORALLY DISINTEGRATING ORAL
Qty: 20 TAB | Refills: 2 | Status: SHIPPED | OUTPATIENT
Start: 2021-03-18

## 2021-03-18 NOTE — PROGRESS NOTES
Avelina Rides  Identified pt with two pt identifiers(name and ). Chief Complaint   Patient presents with   [address]     Previous PCP - Dr. Conrado Jose    Nausea     wants to see if something can be prescribed // been going on for a while    Post Traumatic Stress Disorder    Behavioral Problem       Reviewed record In preparation for visit and have obtained necessary documentation. 1. Have you been to the ER, urgent care clinic or hospitalized since your last visit? No     2. Have you seen or consulted any other health care providers outside of the [address] [place] [address] [facility] since your last visit? Include any pap smears or colon screening. Yes. PAP - [facility]    Patient does not have an advance directive. Vitals reviewed with provider. Health Maintenance reviewed:     Health Maintenance Due   Topic    Hepatitis C Screening     DTaP/Tdap/Td series (1 - Tdap)    PAP AKA CERVICAL CYTOLOGY           Wt Readings from Last 3 Encounters:   05/15/19 131 lb 12.8 oz (59.8 kg)   16 121 lb 14.4 oz (55.3 kg)   16 117 lb 1 oz (53.1 kg)        Temp Readings from Last 3 Encounters:   20 98.5 °F (36.9 °C)   10/14/19 98.3 °F (36.8 °C)   05/15/19 97.3 °F (36.3 °C) (Oral)        BP Readings from Last 3 Encounters:   20 (!) 100/54   10/14/19 120/81   05/15/19 114/81        Pulse Readings from Last 3 Encounters:   20 62   10/14/19 (!) 104   05/15/19 91      There were no vitals filed for this visit.        Learning Assessment:   :       Learning Assessment 3/18/2021 2014   PRIMARY LEARNER Patient Patient   HIGHEST LEVEL OF EDUCATION - PRIMARY LEARNER  GRADUATED HIGH SCHOOL OR GED GRADUATED HIGH SCHOOL OR GED   BARRIERS PRIMARY LEARNER COGNITIVE READING   CO-LEARNER CAREGIVER - No   PRIMARY LANGUAGE ENGLISH ENGLISH   LEARNER PREFERENCE PRIMARY OTHER (COMMENT) DEMONSTRATION   ANSWERED BY Patient Patient   RELATIONSHIP SELF SELF        Depression Screening: :       3 most recent PHQ Screens 3/18/2021   Little interest or pleasure in doing things Not at all   Feeling down, depressed, irritable, or hopeless Not at all   Total Score PHQ 2 0        Fall Risk Assessment:   :       Fall Risk Assessment, last 12 mths 5/15/2019   Able to walk? Yes   Fall in past 12 months? Yes   Number of falls in past 12 months 3   Fall with injury? 1        Abuse Screening:   :       Abuse Screening Questionnaire 5/15/2019   Do you ever feel afraid of your partner? N   Are you in a relationship with someone who physically or mentally threatens you? N   Is it safe for you to go home?  Y        ADL Screening:   :       ADL Assessment 3/18/2021   Feeding yourself No Help Needed   Getting from bed to chair No Help Needed   Getting dressed No Help Needed   Bathing or showering No Help Needed   Walk across the room (includes cane/walker) Help Needed   Using the telphone No Help Needed   Taking your medications No Help Needed   Preparing meals No Help Needed   Managing money (expenses/bills) No Help Needed   Moderately strenuous housework (laundry) No Help Needed   Shopping for personal items (toiletries/medicines) No Help Needed   Shopping for groceries No Help Needed   Driving No Help Needed   Climbing a flight of stairs Help Needed   Getting to places beyond walking distances Help Needed

## 2021-03-18 NOTE — PROGRESS NOTES
Flo Atkins is a 32 y.o. female who was seen by synchronous (real-time) audio-video technology on 3/18/2021 for Establish Care (Previous PCP - Dr. Henrietta Frye), Nausea (wants to see if something can be prescribed // been going on for a while), Post Traumatic Stress Disorder, and Behavioral Problem        Assessment & Plan:   Diagnoses and all orders for this visit:    1. PTSD (post-traumatic stress disorder)  -     ondansetron (Zofran ODT) 4 mg disintegrating tablet; Take 1 Tab by mouth every eight (8) hours as needed for Nausea. -     buPROPion XL (WELLBUTRIN XL) 150 mg tablet; Take 1 Tab by mouth every morning. Begin PRN Zofran for nausea and wellbutrin daily for tobacco cessation and anxiety  2. Anxiety  -     buPROPion XL (WELLBUTRIN XL) 150 mg tablet; Take 1 Tab by mouth every morning. 3. Tobacco abuse  -     buPROPion XL (WELLBUTRIN XL) 150 mg tablet; Take 1 Tab by mouth every morning. See above  4. [facility]  -     naloxone (NARCAN) 0.4 mg/mL injection; 1 mL by IntraMUSCular route EVERY 2 MINUTES AS NEEDED (overdose). Continue methadone. Needs psych and vascular surgery. Instructed to call medicaid office and find who she can see her. The complexity of medical decision making for this visit is moderate     I spent at least 30 minutes on this visit with this established patient. 712  Subjective:     Here to establish care. Former Dr. So Valera. 2019 had 7 blood clots removed from groin, leg. Now cant walk for long periods of time. Feels like a permanent binu horse. Was Medicaid and her vascular surgeon no longer accepted her insurance. Has a lot of nausea related to her menstrual cycle and panic attacks. She has a Hx of PTSD. Diagnosed in 2019. Had still born accident in 2010. After the stillborn she began using opiate pills and has been using off and on. Tried suboxone and \"it wasn't for her. \" She is currently using a Methadone treatment plan.  She notes she still feels like she is on the wrong dose as she states she constantly feels withdrawal symptoms, including nausea. She reports panic episodes \"daily\" with some agoraphobia where she gets nervous about driving and being in public. She is a tobacco user and is ready to quit as soon as she gets on the proper dose of methadone. Prior to Admission medications    Medication Sig Start Date End Date Taking? Authorizing Provider   Narcan 4 mg/actuation nasal spray  2/26/21  Yes Provider, Historical   methadone (DOLOPHINE) 10 mg tablet Take  by mouth. Yes Provider, Historical   INTRAUTERINE DEVICE, IUD, IU by IntraUTERine route. Yes Provider, Historical   ondansetron (Zofran ODT) 4 mg disintegrating tablet Take 1 Tab by mouth every eight (8) hours as needed for Nausea. 12/18/20   Ab Paulson, DO   traZODone (DESYREL) 50 mg tablet Take 1 Tab by mouth nightly. 10/14/19   Aden Hays, DO   FLUoxetine (PROZAC) 20 mg tablet Take 20 mg by mouth daily. Other, MD Brianda   buprenorphine HCl/naloxone HCl (SUBOXONE SL) by SubLINGual route daily. 16 mg daily   Indications: symptoms from stopping treatment with opioid drugs    Other, MD Brianda   ketorolac (TORADOL) 10 mg tablet take 1 tablet by mouth every 6 hours 5/13/19   Provider, Historical   gabapentin (NEURONTIN) 100 mg capsule TAKE 2 CAPSULES BY MOUTH 3 TIMES A DAY 5/3/19   Provider, Historical   SSD 1 % topical cream APPLY TO AFFECTED AREA DAILY 5/3/19   Provider, Historical   atomoxetine (STRATTERA) 40 mg capsule Take 1 Cap by mouth daily. 5/15/19   Shankar Martin MD   acetaminophen (TYLENOL) 325 mg tablet Take  by mouth every four (4) hours as needed.     Provider, Historical     Patient Active Problem List   Diagnosis Code    ADHD (attention deficit hyperactivity disorder) F90.9    Tobacco abuse Z72.0    Grief F43.21    Rib pain R07.81    Alcohol problem drinking Z72.89     Patient Active Problem List    Diagnosis Date Noted    Alcohol problem drinking 05/03/2016    Rib pain 10/24/2011    Grief 01/03/2011    ADHD (attention deficit hyperactivity disorder) 09/10/2009    Tobacco abuse 09/10/2009     Current Outpatient Medications   Medication Sig Dispense Refill    methadone (DOLOPHINE) 10 mg tablet Take  by mouth.  INTRAUTERINE DEVICE, IUD, IU by IntraUTERine route.  ondansetron (Zofran ODT) 4 mg disintegrating tablet Take 1 Tab by mouth every eight (8) hours as needed for Nausea. 20 Tab 2    naloxone (NARCAN) 0.4 mg/mL injection 1 mL by IntraMUSCular route EVERY 2 MINUTES AS NEEDED (overdose). 1 mL 3    buPROPion XL (WELLBUTRIN XL) 150 mg tablet Take 1 Tab by mouth every morning. 30 Tab 5     No Known Allergies  Past Medical History:   Diagnosis Date    ADHD 10    Dr. Johnathan Brooks    ADHD (attention deficit hyperactivity disorder) 9/10/2009    Advanced care planning/counseling discussion 5/3/16    Anemia Before Preg. Not taking Iron   Bernestine Vikas Gave birth to child recently [id number]    Baby Boy weighed 4 lb 15 oz    HX OTHER MEDICAL     history of viral meningitis 1990    HX OTHER MEDICAL     stillbirth 45 wks 12/2010    HX OTHER MEDICAL 2008?     Achilles tendonitis    Opiate dependence ([facility]     started after ankle injury     Past Surgical History:   Procedure Laterality Date    HX GYN      3 vaginal births     Family History   Problem Relation Age of Onset    Hypertension Father     No Known Problems Brother     Hypertension Mother     Bipolar Disorder Mother     No Known Problems Maternal Grandmother     Cancer Maternal Grandfather     Heart Disease Paternal Grandmother     Bipolar Disorder Maternal Aunt      Social History     Tobacco Use    Smoking status: Current Every Day Smoker     Packs/day: 1.00     Years: 5.00     Pack years: 5.00     Types: Cigarettes    Smokeless tobacco: Never Used    Tobacco comment: will quit with    Substance Use Topics    Alcohol use: No     Alcohol/week: 0.0 standard drinks Comment: socially, but not while on methadone       Review of Systems   Constitutional: Negative for chills, fever, malaise/fatigue and weight loss. HENT: Negative for ear pain, hearing loss and sore throat. Respiratory: Negative for cough and shortness of breath. Cardiovascular: Negative for chest pain and leg swelling. Gastrointestinal: Positive for nausea. Negative for abdominal pain, blood in stool, constipation, diarrhea, heartburn, melena and vomiting. Genitourinary: Negative for dysuria and hematuria. Musculoskeletal: Negative for back pain and myalgias. Skin: Negative for rash. Neurological: Negative for weakness and headaches. Psychiatric/Behavioral: Negative for depression. The patient is nervous/anxious. Objective:     Patient-Reported Vitals 3/18/2021   Patient-Reported Weight 125lb   Patient-Reported Systolic  587   Patient-Reported Diastolic 84   Patient-Reported LMP 03/15/2021      General: alert, cooperative, no distress   Mental  status: normal mood, behavior, speech, dress, motor activity, and thought processes, able to follow commands   HENT: NCAT   Neck: no visualized mass   Resp: no respiratory distress   Neuro: no gross deficits   Skin: no discoloration or lesions of concern on visible areas   Psychiatric: normal affect, consistent with stated mood, no evidence of hallucinations     Additional exam findings: We discussed the expected course, resolution and complications of the diagnosis(es) in detail. Medication risks, benefits, costs, interactions, and alternatives were discussed as indicated. I advised her to contact the office if her condition worsens, changes or fails to improve as anticipated. She expressed understanding with the diagnosis(es) and plan. Tracy Hernandez, was evaluated through a synchronous (real-time) audio-video encounter. The patient (or guardian if applicable) is aware that this is a billable service.  Verbal consent to proceed has been obtained within the past 12 months. The visit was conducted pursuant to the emergency declaration under the [address] [place] [address] and the [employer] and [employer] General Act. Patient identification was verified, and a caregiver was present when appropriate. The patient was located in a state where the provider was credentialed to provide care.     Jared Castillo PA-C

## 2023-05-12 RX ORDER — METHADONE HYDROCHLORIDE 10 MG/1
TABLET ORAL
COMMUNITY

## 2023-05-12 RX ORDER — BUPROPION HYDROCHLORIDE 150 MG/1
TABLET ORAL
COMMUNITY
Start: 2021-03-18

## 2023-05-12 RX ORDER — ONDANSETRON 4 MG/1
TABLET, ORALLY DISINTEGRATING ORAL EVERY 8 HOURS PRN
COMMUNITY
Start: 2021-03-18